# Patient Record
Sex: MALE | Race: BLACK OR AFRICAN AMERICAN | NOT HISPANIC OR LATINO | Employment: OTHER | URBAN - METROPOLITAN AREA
[De-identification: names, ages, dates, MRNs, and addresses within clinical notes are randomized per-mention and may not be internally consistent; named-entity substitution may affect disease eponyms.]

---

## 2017-12-13 ENCOUNTER — HOSPITAL ENCOUNTER (EMERGENCY)
Facility: OTHER | Age: 42
Discharge: HOME | End: 2017-12-13
Attending: EMERGENCY MEDICINE
Payer: MEDICAID

## 2017-12-13 VITALS
RESPIRATION RATE: 17 BRPM | WEIGHT: 260 LBS | DIASTOLIC BLOOD PRESSURE: 92 MMHG | HEART RATE: 76 BPM | HEIGHT: 72 IN | TEMPERATURE: 98 F | OXYGEN SATURATION: 99 % | SYSTOLIC BLOOD PRESSURE: 173 MMHG | BODY MASS INDEX: 35.21 KG/M2

## 2017-12-13 DIAGNOSIS — J06.9 VIRAL URI WITH COUGH: Primary | ICD-10-CM

## 2017-12-13 DIAGNOSIS — R05.9 COUGH: ICD-10-CM

## 2017-12-13 LAB
FLUAV AG SPEC QL IA: NEGATIVE
FLUBV AG SPEC QL IA: NEGATIVE
SPECIMEN SOURCE: NORMAL

## 2017-12-13 PROCEDURE — 99284 EMERGENCY DEPT VISIT MOD MDM: CPT

## 2017-12-13 PROCEDURE — 87400 INFLUENZA A/B EACH AG IA: CPT | Mod: 59

## 2017-12-13 RX ORDER — CARBAMAZEPINE 300 MG/1
300 CAPSULE, EXTENDED RELEASE ORAL 2 TIMES DAILY
COMMUNITY
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
## 2017-12-14 NOTE — ED TRIAGE NOTES
"Pt c/o " bad cold. I get this every year. I've been congested and coughing that past couple days unrelieved by robitussin". Denies fever, N/V.   "

## 2019-01-01 ENCOUNTER — HOSPITAL ENCOUNTER (EMERGENCY)
Facility: OTHER | Age: 44
Discharge: HOME | End: 2019-01-01
Attending: EMERGENCY MEDICINE
Payer: MEDICAID

## 2019-01-01 VITALS
SYSTOLIC BLOOD PRESSURE: 138 MMHG | WEIGHT: 240 LBS | BODY MASS INDEX: 34.36 KG/M2 | TEMPERATURE: 99 F | OXYGEN SATURATION: 98 % | HEIGHT: 70 IN | RESPIRATION RATE: 17 BRPM | HEART RATE: 78 BPM | DIASTOLIC BLOOD PRESSURE: 73 MMHG

## 2019-01-01 DIAGNOSIS — J06.9 URI WITH COUGH AND CONGESTION: Primary | ICD-10-CM

## 2019-01-01 DIAGNOSIS — R05.9 COUGH: ICD-10-CM

## 2019-01-01 LAB
INFLUENZA A, MOLECULAR: NEGATIVE
INFLUENZA B, MOLECULAR: NEGATIVE
SPECIMEN SOURCE: NORMAL

## 2019-01-01 PROCEDURE — 99284 EMERGENCY DEPT VISIT MOD MDM: CPT | Mod: 25

## 2019-01-01 PROCEDURE — 25000242 PHARM REV CODE 250 ALT 637 W/ HCPCS: Performed by: EMERGENCY MEDICINE

## 2019-01-01 PROCEDURE — 94761 N-INVAS EAR/PLS OXIMETRY MLT: CPT

## 2019-01-01 PROCEDURE — 94640 AIRWAY INHALATION TREATMENT: CPT

## 2019-01-01 PROCEDURE — 87502 INFLUENZA DNA AMP PROBE: CPT

## 2019-01-01 RX ORDER — ALBUTEROL SULFATE 90 UG/1
AEROSOL, METERED RESPIRATORY (INHALATION)
Refills: 0 | COMMUNITY
Start: 2018-11-02 | End: 2019-01-01 | Stop reason: SDUPTHER

## 2019-01-01 RX ORDER — IPRATROPIUM BROMIDE AND ALBUTEROL SULFATE 2.5; .5 MG/3ML; MG/3ML
3 SOLUTION RESPIRATORY (INHALATION)
Status: COMPLETED | OUTPATIENT
Start: 2019-01-01 | End: 2019-01-01

## 2019-01-01 RX ORDER — ALBUTEROL SULFATE 90 UG/1
AEROSOL, METERED RESPIRATORY (INHALATION)
Qty: 18 G | Refills: 0 | Status: SHIPPED | OUTPATIENT
Start: 2019-01-01 | End: 2021-12-28 | Stop reason: ALTCHOICE

## 2019-01-01 RX ORDER — ZONISAMIDE 100 MG/1
CAPSULE ORAL
Refills: 5 | COMMUNITY
Start: 2018-12-08

## 2019-01-01 RX ORDER — FLUTICASONE PROPIONATE 50 MCG
1 SPRAY, SUSPENSION (ML) NASAL 2 TIMES DAILY PRN
Qty: 15 G | Refills: 0 | Status: SHIPPED | OUTPATIENT
Start: 2019-01-01

## 2019-01-01 RX ADMIN — IPRATROPIUM BROMIDE AND ALBUTEROL SULFATE 3 ML: .5; 3 SOLUTION RESPIRATORY (INHALATION) at 03:01
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
## 2019-01-01 NOTE — ED TRIAGE NOTES
44 yo male to ED w/ complaints of chest congestion, stuffiness, and cough. Tried taking robitussin but hasn't been working. Symptoms has been going on since X-mas. VSS, AAOx4, NAD. Denies any other medical complaints.

## 2019-01-09 ENCOUNTER — HOSPITAL ENCOUNTER (EMERGENCY)
Facility: OTHER | Age: 44
Discharge: HOME | End: 2019-01-09
Attending: EMERGENCY MEDICINE
Payer: MEDICAID

## 2019-01-09 VITALS
DIASTOLIC BLOOD PRESSURE: 91 MMHG | SYSTOLIC BLOOD PRESSURE: 162 MMHG | BODY MASS INDEX: 34.07 KG/M2 | WEIGHT: 230 LBS | TEMPERATURE: 98 F | HEIGHT: 69 IN | OXYGEN SATURATION: 98 % | HEART RATE: 71 BPM | RESPIRATION RATE: 18 BRPM

## 2019-01-09 DIAGNOSIS — J06.9 URI, ACUTE: Primary | ICD-10-CM

## 2019-01-09 PROCEDURE — 99283 EMERGENCY DEPT VISIT LOW MDM: CPT

## 2019-01-09 RX ORDER — AZITHROMYCIN 250 MG/1
TABLET, FILM COATED ORAL
Qty: 6 TABLET | Refills: 0 | Status: SHIPPED | OUTPATIENT
Start: 2019-01-09

## 2019-01-09 NOTE — ED PROVIDER NOTES
"Encounter Date: 1/9/2019    SCRIBE #1 NOTE: I, Betsy Richardson, am scribing for, and in the presence of, Dr. Shepard.       History     Chief Complaint   Patient presents with    URI     Pt CO productive cough, and nasal congestion since Christmas.      Time seen by provider: 12:45 AM    This is a 43 y.o. male who presents with complaint of productive cough that began approximately sixteen days ago. The patient has recently noticed streaks of blood in sputum. Flu and strep swabs obtained in the ED eight days ago were negative. Upon discharge, the patient was prescribed Flonase and given an albuterol inhaler. He has used these in addition to Vitamin C with little improvement. Pt mentions that he has been "sweating a lot," but denies fever, chills, SOB, or myalgias.       The history is provided by the patient.     Review of patient's allergies indicates:  No Known Allergies  Past Medical History:   Diagnosis Date    Hypertension     Seizures      No past surgical history on file.  No family history on file.  Social History     Tobacco Use    Smoking status: Never Smoker    Smokeless tobacco: Never Used   Substance Use Topics    Alcohol use: No    Drug use: Not on file     Review of Systems   Constitutional: Negative for activity change, appetite change, chills, diaphoresis and fever.   HENT: Negative for congestion, sore throat and trouble swallowing.    Eyes: Negative for photophobia and visual disturbance.   Respiratory: Positive for cough. Negative for chest tightness and shortness of breath.    Cardiovascular: Negative for chest pain.   Gastrointestinal: Negative for abdominal pain, nausea and vomiting.   Endocrine: Negative for polydipsia, polyphagia and polyuria.   Genitourinary: Negative for difficulty urinating and flank pain.   Musculoskeletal: Negative for back pain and neck pain.   Skin: Negative for pallor.   Neurological: Negative for weakness and headaches.   Psychiatric/Behavioral: Negative for " confusion.       Physical Exam     Initial Vitals [01/09/19 0020]   BP Pulse Resp Temp SpO2   (!) 162/91 71 18 98.2 °F (36.8 °C) 98 %      MAP       --         Physical Exam    Nursing note and vitals reviewed.  Constitutional: He appears well-developed and well-nourished. He is not diaphoretic. No distress.   HENT:   Head: Normocephalic and atraumatic.   Mouth/Throat: Oropharynx is clear and moist.   Eyes: EOM are normal. Pupils are equal, round, and reactive to light.   Neck: Normal range of motion.   Cardiovascular: Normal rate, regular rhythm and normal heart sounds. Exam reveals no gallop and no friction rub.    No murmur heard.  Pulmonary/Chest: Breath sounds normal. No respiratory distress. He has no wheezes. He has no rhonchi. He has no rales.   Musculoskeletal: Normal range of motion. He exhibits no edema or tenderness.   Neurological: He is alert and oriented to person, place, and time.   Skin: Skin is warm and dry. No rash and no abscess noted. No erythema. No pallor.   Psychiatric: He has a normal mood and affect. His behavior is normal. Judgment and thought content normal.         ED Course   Procedures  Labs Reviewed - No data to display             Additional MDM:   Comments: 43-year-old male presents complaining of symptoms consistent with an upper respiratory infection.  This is his 2nd emergency department visit for the same complaint.  He has previously had a chest x-ray as well as a flu swab which were negative. At this time his symptoms have been present for approximately 2 weeks, therefore, he will be given a prescription for Z-Javon.  Patient instructed to follow up with his primary care doctor within the next 5-7 days upon completion of the antibiotics..          Scribe Attestation:   Scribe #1: I performed the above scribed service and the documentation accurately describes the services I performed. I attest to the accuracy of the note.    Attending Attestation:           Physician Attestation  for Scribe:  Physician Attestation Statement for Scribe #1: I, Dr. Shepard, reviewed documentation, as scribed by Betsy Richardson in my presence, and it is both accurate and complete.                    Clinical Impression:     1. URI, acute                                   Rosa Maria Shepard MD  01/09/19 0128

## 2019-12-20 ENCOUNTER — HOSPITAL ENCOUNTER (EMERGENCY)
Facility: OTHER | Age: 44
Discharge: HOME | End: 2019-12-20
Attending: EMERGENCY MEDICINE
Payer: MEDICAID

## 2019-12-20 VITALS
DIASTOLIC BLOOD PRESSURE: 82 MMHG | RESPIRATION RATE: 17 BRPM | HEIGHT: 72 IN | WEIGHT: 243 LBS | BODY MASS INDEX: 32.91 KG/M2 | OXYGEN SATURATION: 99 % | HEART RATE: 60 BPM | TEMPERATURE: 98 F | SYSTOLIC BLOOD PRESSURE: 142 MMHG

## 2019-12-20 DIAGNOSIS — R05.9 COUGH: Primary | ICD-10-CM

## 2019-12-20 DIAGNOSIS — M54.42 ACUTE LEFT-SIDED LOW BACK PAIN WITH LEFT-SIDED SCIATICA: ICD-10-CM

## 2019-12-20 LAB
CTP QC/QA: YES
POC MOLECULAR INFLUENZA A AGN: NEGATIVE
POC MOLECULAR INFLUENZA B AGN: NEGATIVE

## 2019-12-20 PROCEDURE — 94640 AIRWAY INHALATION TREATMENT: CPT

## 2019-12-20 PROCEDURE — 25000242 PHARM REV CODE 250 ALT 637 W/ HCPCS: Performed by: PHYSICIAN ASSISTANT

## 2019-12-20 PROCEDURE — 99284 EMERGENCY DEPT VISIT MOD MDM: CPT | Mod: 25

## 2019-12-20 PROCEDURE — 63600175 PHARM REV CODE 636 W HCPCS: Performed by: PHYSICIAN ASSISTANT

## 2019-12-20 PROCEDURE — 96372 THER/PROPH/DIAG INJ SC/IM: CPT

## 2019-12-20 RX ORDER — PREDNISONE 20 MG/1
60 TABLET ORAL
Status: COMPLETED | OUTPATIENT
Start: 2019-12-20 | End: 2019-12-20

## 2019-12-20 RX ORDER — METHOCARBAMOL 500 MG/1
1000 TABLET, FILM COATED ORAL 3 TIMES DAILY PRN
Qty: 18 TABLET | Refills: 0 | Status: SHIPPED | OUTPATIENT
Start: 2019-12-20

## 2019-12-20 RX ORDER — KETOROLAC TROMETHAMINE 30 MG/ML
30 INJECTION, SOLUTION INTRAMUSCULAR; INTRAVENOUS
Status: COMPLETED | OUTPATIENT
Start: 2019-12-20 | End: 2019-12-20

## 2019-12-20 RX ORDER — PREDNISONE 50 MG/1
50 TABLET ORAL DAILY
Qty: 4 TABLET | Refills: 0 | Status: SHIPPED | OUTPATIENT
Start: 2019-12-21 | End: 2019-12-25

## 2019-12-20 RX ORDER — IPRATROPIUM BROMIDE AND ALBUTEROL SULFATE 2.5; .5 MG/3ML; MG/3ML
3 SOLUTION RESPIRATORY (INHALATION)
Status: COMPLETED | OUTPATIENT
Start: 2019-12-20 | End: 2019-12-20

## 2019-12-20 RX ORDER — NAPROXEN 500 MG/1
500 TABLET ORAL EVERY 12 HOURS PRN
Qty: 10 TABLET | Refills: 0 | Status: SHIPPED | OUTPATIENT
Start: 2019-12-20 | End: 2019-12-25

## 2019-12-20 RX ADMIN — PREDNISONE 60 MG: 20 TABLET ORAL at 11:12

## 2019-12-20 RX ADMIN — IPRATROPIUM BROMIDE AND ALBUTEROL SULFATE 3 ML: .5; 3 SOLUTION RESPIRATORY (INHALATION) at 11:12

## 2019-12-20 RX ADMIN — KETOROLAC TROMETHAMINE 30 MG: 30 INJECTION, SOLUTION INTRAMUSCULAR; INTRAVENOUS at 11:12

## 2019-12-20 NOTE — ED TRIAGE NOTES
Patient presents to ER with c/o cough with nasal congestion for several weeks patient reports taking mucinex with no relieve.  Patient also reports lower back pain for several days.  Pain 10/10.  Patient denies chest pain, sob, any radiating pain, nausea and vomiting.

## 2019-12-20 NOTE — ED PROVIDER NOTES
Encounter Date: 12/20/2019       History     Chief Complaint   Patient presents with    Cough     Pt reports white productive for the past four weeks not relieved with mucinex. Pt also reports lower back pain for the past two days.      Patient is a 44-year-old male with hypertension and seizures who presents to the emergency department with a cough and back pain. Patient reports a productive cough with white and yellow sputum for the past 3 weeks.  He reports shortness of breath only when lying down at night, specifically when he has a postnasal drip.  He also reports congestion.  He reports taking over-the-counter medications without relief.  He denies fever or chills.  He is also reporting left lower back pain which radiates into his left buttocks and behind his left leg for the past 3 days.  He denies injury. He denies saddle anesthesia or bowel/bladder incontinence.  He has not taken analgesics for his back pain.    The history is provided by the patient.     Review of patient's allergies indicates:  No Known Allergies  Past Medical History:   Diagnosis Date    Hypertension     Seizures      No past surgical history on file.  No family history on file.  Social History     Tobacco Use    Smoking status: Never Smoker    Smokeless tobacco: Never Used   Substance Use Topics    Alcohol use: No    Drug use: Not on file     Review of Systems   Constitutional: Negative for chills and fever.   HENT: Positive for congestion. Negative for sore throat.    Respiratory: Positive for cough and shortness of breath (With lying down).    Cardiovascular: Negative for chest pain.   Gastrointestinal: Negative for abdominal pain, diarrhea, nausea and vomiting.   Genitourinary: Negative for difficulty urinating.   Musculoskeletal: Positive for back pain. Negative for arthralgias.   Skin: Negative for rash.   Allergic/Immunologic: Negative for immunocompromised state.   Neurological: Negative for weakness, numbness and  headaches.       Physical Exam     Initial Vitals [12/20/19 1034]   BP Pulse Resp Temp SpO2   (!) 145/78 64 18 98.5 °F (36.9 °C) 99 %      MAP       --         Physical Exam    Nursing note and vitals reviewed.  Constitutional: He is cooperative. No distress.   HENT:   Head: Normocephalic and atraumatic.   Eyes: Conjunctivae and EOM are normal.   Neck: Normal range of motion. Neck supple.   Cardiovascular: Normal rate, regular rhythm and intact distal pulses.   Pulmonary/Chest: He has no rhonchi. He has no rales.   Slightly diminished air movement upon expiration.   Musculoskeletal: Normal range of motion. He exhibits no edema.   No CT L midline tenderness, crepitus, masses, step-offs or deformities.  Negative straight leg raise but pain is reproduced with this on the left side to the lower back   Neurological: He is alert and oriented to person, place, and time. He has normal strength. GCS eye subscore is 4. GCS verbal subscore is 5. GCS motor subscore is 6.   Skin: Skin is warm and dry. No rash noted.         ED Course   Procedures  Labs Reviewed - No data to display       Imaging Results          X-Ray Chest PA And Lateral (Final result)  Result time 12/20/19 10:54:41    Final result by Kwan Saenz MD (12/20/19 10:54:41)                 Impression:      No acute abnormality.      Electronically signed by: Kwan Saenz MD  Date:    12/20/2019  Time:    10:54             Narrative:    EXAMINATION:  XR CHEST PA AND LATERAL    CLINICAL HISTORY:  Cough    TECHNIQUE:  PA and lateral views of the chest were performed.    COMPARISON:  01/01/2019    FINDINGS:  The lungs are clear, with normal appearance of pulmonary vasculature and no pleural effusion or pneumothorax.    The cardiac silhouette is normal in size. The hilar and mediastinal contours are unremarkable.    Bones are intact. Innumerable round radiopaque densities overlie the left harsh chest and left upper extremity, unchanged.                                  Medical Decision Making:   Initial Assessment:   Urgent evaluation of a 44 y.o. male presenting to the emergency department complaining of cough and nontraumatic back pain. Patient is afebrile, nontoxic appearing and hemodynamically stable.  1) cough for 3 weeks, no fever.  Slightly diminished air movement upon expiration.  He is not hypoxic or tachypneic.  Patient has inhaler at home but is not using it.  Chest x-ray obtained from waiting room reveals no consolidation.  Will provide patient with breathing treatment and steroids.  2) back pain- The patient's back pain is likely a musculoskeletal strain versus sciatica.  There are no signs of saddle anesthesia, incontinence, neurologic deficits, fevers, trauma or midline tenderness on history or physical to suggest cauda equina, infectious process, fracture or subluxation.  I will treat with anti-inflammatories and muscle relaxers for relief.                                 Clinical Impression:     1. Cough    2. Acute left-sided low back pain with left-sided sciatica                            Prashant Palencia PA-C  12/20/19 2871

## 2020-09-02 ENCOUNTER — HOSPITAL ENCOUNTER (EMERGENCY)
Facility: OTHER | Age: 45
Discharge: HOME | End: 2020-09-02
Attending: EMERGENCY MEDICINE
Payer: MEDICAID

## 2020-09-02 VITALS
BODY MASS INDEX: 33.18 KG/M2 | HEART RATE: 74 BPM | RESPIRATION RATE: 16 BRPM | OXYGEN SATURATION: 100 % | DIASTOLIC BLOOD PRESSURE: 81 MMHG | TEMPERATURE: 98 F | HEIGHT: 72 IN | SYSTOLIC BLOOD PRESSURE: 164 MMHG | WEIGHT: 245 LBS

## 2020-09-02 DIAGNOSIS — L03.011 PARONYCHIA OF RIGHT MIDDLE FINGER: Primary | ICD-10-CM

## 2020-09-02 PROCEDURE — 99284 EMERGENCY DEPT VISIT MOD MDM: CPT | Mod: 25

## 2020-09-02 PROCEDURE — 10060 I&D ABSCESS SIMPLE/SINGLE: CPT

## 2020-09-02 PROCEDURE — 25000003 PHARM REV CODE 250: Performed by: EMERGENCY MEDICINE

## 2020-09-02 RX ORDER — IBUPROFEN 600 MG/1
600 TABLET ORAL
Status: COMPLETED | OUTPATIENT
Start: 2020-09-02 | End: 2020-09-02

## 2020-09-02 RX ORDER — HYDROCODONE BITARTRATE AND ACETAMINOPHEN 5; 325 MG/1; MG/1
1 TABLET ORAL EVERY 4 HOURS PRN
Qty: 12 TABLET | Refills: 0 | OUTPATIENT
Start: 2020-09-02 | End: 2021-07-14

## 2020-09-02 RX ORDER — IBUPROFEN 600 MG/1
600 TABLET ORAL EVERY 6 HOURS PRN
Qty: 20 TABLET | Refills: 0 | OUTPATIENT
Start: 2020-09-02 | End: 2021-07-14

## 2020-09-02 RX ORDER — CLINDAMYCIN HYDROCHLORIDE 150 MG/1
300 CAPSULE ORAL 4 TIMES DAILY
Qty: 56 CAPSULE | Refills: 0 | Status: SHIPPED | OUTPATIENT
Start: 2020-09-02 | End: 2020-09-09

## 2020-09-02 RX ADMIN — IBUPROFEN 600 MG: 600 TABLET, FILM COATED ORAL at 04:09

## 2020-09-02 NOTE — ED TRIAGE NOTES
"Pt presents with right middle finger pain and swelling after biting a "hang nail" in his sleep. He reports pain is 8 out of 10. Pt is AAO x 3, answers questions appropriately   "

## 2020-09-02 NOTE — ED PROVIDER NOTES
Encounter Date: 9/2/2020    SCRIBE #1 NOTE: I, Holly Ino, am scribing for, and in the presence of, Dr. Earl.       History     Chief Complaint   Patient presents with    Hand Pain     pain and swelling to R middle finger after biting hangnail off     Time seen by provider: 4:10 PM    This is a 44 y.o. male who presents with complaint of right middle finger pain. Patient reports he bit a hang nail off of the finger and is now having pain and swelling. Incident occurred 2 days ago and has worsened since. He denies any drainage or redness.     The history is provided by the patient. No  was used.     Review of patient's allergies indicates:  No Known Allergies  Past Medical History:   Diagnosis Date    Hypertension     Seizures      History reviewed. No pertinent surgical history.  History reviewed. No pertinent family history.  Social History     Tobacco Use    Smoking status: Never Smoker    Smokeless tobacco: Never Used   Substance Use Topics    Alcohol use: No    Drug use: Not on file     Review of Systems   Constitutional: Negative for fever.   HENT: Negative for sore throat.    Respiratory: Negative for shortness of breath.    Cardiovascular: Negative for chest pain.   Gastrointestinal: Negative for nausea.   Genitourinary: Negative for dysuria.   Musculoskeletal: Positive for arthralgias (right middle finger). Negative for back pain.   Skin: Negative for rash.   Neurological: Negative for weakness.   Hematological: Does not bruise/bleed easily.   All other systems reviewed and are negative.      Physical Exam     Initial Vitals [09/02/20 1555]   BP Pulse Resp Temp SpO2   (!) 190/98 77 18 98.4 °F (36.9 °C) (!) 94 %      MAP       --         Physical Exam    Nursing note and vitals reviewed.  Constitutional: He appears well-developed. He is cooperative.   HENT:   Head: Atraumatic.   Eyes: Conjunctivae and lids are normal.   Neck: Normal range of motion and phonation normal.    Cardiovascular: Normal rate.   Pulmonary/Chest: No respiratory distress.   Musculoskeletal: Normal range of motion. No tenderness or edema.   Neurological: He is alert.   Skin: Skin is warm and dry. No rash noted.   Paronychia noted over radial aspect of right third digit.   Tenderness noted.   Normal ROM, no skin change, no erythema, no felon.    Psychiatric: He has a normal mood and affect. His speech is normal and behavior is normal.         ED Course   I & D - Incision and Drainage    Date/Time: 9/2/2020 5:09 PM  Location procedure was performed: [facility] EMERGENCY DEPARTMENT  Performed by: Deb Earl MD  Authorized by: Deb Earl MD   Consent Done: Yes  Consent: Verbal consent obtained.  Indications for incision and drainage: paronychia.  Body area: upper extremity  Location details: right long finger  Patient sedated: no  Scalpel size: 11  Incision type: single straight  Complexity: simple  Drainage: purulent  Drainage amount: copious  Complications: No  Patient tolerance: Patient tolerated the procedure well with no immediate complications        Labs Reviewed - No data to display       Imaging Results    None          Medical Decision Making:   History:   Old Medical Records: I decided to obtain old medical records.  Old Records Summarized: other records.  Initial Assessment:   43 y/o male with paronychia pain x 2 days. Plan to I&D.   Differential Diagnosis:   Ingrown nail, paronychia, abscess, felon, cellulitis            Scribe Attestation:   Scribe #1: I performed the above scribed service and the documentation accurately describes the services I performed. I attest to the accuracy of the note.    Attending Attestation:           Physician Attestation for Scribe:  Physician Attestation Statement for Scribe #1: I, Dr. Earl, reviewed documentation, as scribed by Holly Mckinnon in my presence, and it is both accurate and complete.                               Clinical Impression:       ICD-10-CM ICD-9-CM    1. Paronychia of right middle finger  L03.011 681.02         Disposition:   Disposition: Discharged  Condition: Stable     ED Disposition Condition    Discharge Stable        ED Prescriptions     Medication Sig Dispense Start Date End Date Auth. Provider    clindamycin (CLEOCIN) 150 MG capsule Take 2 capsules (300 mg total) by mouth 4 (four) times daily. for 7 days 56 capsule 9/2/2020 9/9/2020 Deb Earl MD    HYDROcodone-acetaminophen (NORCO) 5-325 mg per tablet Take 1 tablet by mouth every 4 (four) hours as needed for Pain. 12 tablet 9/2/2020  Deb Earl MD    ibuprofen (ADVIL,MOTRIN) 600 MG tablet Take 1 tablet (600 mg total) by mouth every 6 (six) hours as needed for Pain. Take with food 20 tablet 9/2/2020  Deb Earl MD        Follow-up Information     Follow up With Specialties Details Why Contact Info    [facility]  Schedule an appointment as soon as possible for a visit   [address] [facility] [address]  [phone]      [facility]    [address] [facility] [address]  [phone]                                       Deb Earl MD  09/03/20 0638

## 2021-07-14 ENCOUNTER — HOSPITAL ENCOUNTER (EMERGENCY)
Facility: OTHER | Age: 46
Discharge: HOME | End: 2021-07-14
Attending: EMERGENCY MEDICINE
Payer: MEDICAID

## 2021-07-14 VITALS
DIASTOLIC BLOOD PRESSURE: 74 MMHG | HEIGHT: 72 IN | BODY MASS INDEX: 32.51 KG/M2 | HEART RATE: 71 BPM | OXYGEN SATURATION: 99 % | RESPIRATION RATE: 20 BRPM | TEMPERATURE: 98 F | SYSTOLIC BLOOD PRESSURE: 131 MMHG | WEIGHT: 240 LBS

## 2021-07-14 DIAGNOSIS — N20.0 KIDNEY STONE: ICD-10-CM

## 2021-07-14 DIAGNOSIS — N20.1 URETEROLITHIASIS: Primary | ICD-10-CM

## 2021-07-14 LAB
ALBUMIN SERPL BCP-MCNC: 3.7 G/DL (ref 3.5–5.2)
ALP SERPL-CCNC: 77 U/L (ref 55–135)
ALT SERPL W/O P-5'-P-CCNC: 22 U/L (ref 10–44)
ANION GAP SERPL CALC-SCNC: 7 MMOL/L (ref 8–16)
AST SERPL-CCNC: 19 U/L (ref 10–40)
BASOPHILS # BLD AUTO: 0.04 K/UL (ref 0–0.2)
BASOPHILS NFR BLD: 0.6 % (ref 0–1.9)
BILIRUB SERPL-MCNC: 0.3 MG/DL (ref 0.1–1)
BILIRUB UR QL STRIP: NEGATIVE
BUN SERPL-MCNC: 13 MG/DL (ref 6–20)
CALCIUM SERPL-MCNC: 8.5 MG/DL (ref 8.7–10.5)
CHLORIDE SERPL-SCNC: 106 MMOL/L (ref 95–110)
CLARITY UR: CLEAR
CO2 SERPL-SCNC: 24 MMOL/L (ref 23–29)
COLOR UR: YELLOW
CREAT SERPL-MCNC: 1.2 MG/DL (ref 0.5–1.4)
CREAT SERPL-MCNC: 1.2 MG/DL (ref 0.5–1.4)
DIFFERENTIAL METHOD: ABNORMAL
EOSINOPHIL # BLD AUTO: 0 K/UL (ref 0–0.5)
EOSINOPHIL NFR BLD: 0.1 % (ref 0–8)
ERYTHROCYTE [DISTWIDTH] IN BLOOD BY AUTOMATED COUNT: 13.3 % (ref 11.5–14.5)
EST. GFR  (AFRICAN AMERICAN): >60 ML/MIN/1.73 M^2
EST. GFR  (NON AFRICAN AMERICAN): >60 ML/MIN/1.73 M^2
GLUCOSE SERPL-MCNC: 128 MG/DL (ref 70–110)
GLUCOSE UR QL STRIP: NEGATIVE
HCT VFR BLD AUTO: 40.2 % (ref 40–54)
HGB BLD-MCNC: 13.3 G/DL (ref 14–18)
HGB UR QL STRIP: ABNORMAL
IMM GRANULOCYTES # BLD AUTO: 0.02 K/UL (ref 0–0.04)
IMM GRANULOCYTES NFR BLD AUTO: 0.3 % (ref 0–0.5)
KETONES UR QL STRIP: NEGATIVE
LEUKOCYTE ESTERASE UR QL STRIP: NEGATIVE
LYMPHOCYTES # BLD AUTO: 1 K/UL (ref 1–4.8)
LYMPHOCYTES NFR BLD: 15.2 % (ref 18–48)
MCH RBC QN AUTO: 28.9 PG (ref 27–31)
MCHC RBC AUTO-ENTMCNC: 33.1 G/DL (ref 32–36)
MCV RBC AUTO: 87 FL (ref 82–98)
MICROSCOPIC COMMENT: NORMAL
MONOCYTES # BLD AUTO: 0.7 K/UL (ref 0.3–1)
MONOCYTES NFR BLD: 10 % (ref 4–15)
NEUTROPHILS # BLD AUTO: 5 K/UL (ref 1.8–7.7)
NEUTROPHILS NFR BLD: 73.8 % (ref 38–73)
NITRITE UR QL STRIP: NEGATIVE
NRBC BLD-RTO: 0 /100 WBC
PH UR STRIP: 7 [PH] (ref 5–8)
PLATELET # BLD AUTO: 185 K/UL (ref 150–450)
PMV BLD AUTO: 10.3 FL (ref 9.2–12.9)
POTASSIUM SERPL-SCNC: 4.1 MMOL/L (ref 3.5–5.1)
PROT SERPL-MCNC: 6.7 G/DL (ref 6–8.4)
PROT UR QL STRIP: NEGATIVE
RBC # BLD AUTO: 4.6 M/UL (ref 4.6–6.2)
RBC #/AREA URNS HPF: 3 /HPF (ref 0–4)
SAMPLE: NORMAL
SODIUM SERPL-SCNC: 137 MMOL/L (ref 136–145)
SP GR UR STRIP: 1.01 (ref 1–1.03)
URN SPEC COLLECT METH UR: ABNORMAL
UROBILINOGEN UR STRIP-ACNC: NEGATIVE EU/DL
WBC # BLD AUTO: 6.79 K/UL (ref 3.9–12.7)
WBC #/AREA URNS HPF: 1 /HPF (ref 0–5)

## 2021-07-14 PROCEDURE — 87591 N.GONORRHOEAE DNA AMP PROB: CPT | Performed by: EMERGENCY MEDICINE

## 2021-07-14 PROCEDURE — 63600175 PHARM REV CODE 636 W HCPCS: Performed by: EMERGENCY MEDICINE

## 2021-07-14 PROCEDURE — 80053 COMPREHEN METABOLIC PANEL: CPT | Performed by: EMERGENCY MEDICINE

## 2021-07-14 PROCEDURE — 96374 THER/PROPH/DIAG INJ IV PUSH: CPT

## 2021-07-14 PROCEDURE — 99285 EMERGENCY DEPT VISIT HI MDM: CPT | Mod: 25

## 2021-07-14 PROCEDURE — 25500020 PHARM REV CODE 255: Performed by: EMERGENCY MEDICINE

## 2021-07-14 PROCEDURE — 85025 COMPLETE CBC W/AUTO DIFF WBC: CPT | Performed by: EMERGENCY MEDICINE

## 2021-07-14 PROCEDURE — 96376 TX/PRO/DX INJ SAME DRUG ADON: CPT

## 2021-07-14 PROCEDURE — 96375 TX/PRO/DX INJ NEW DRUG ADDON: CPT

## 2021-07-14 PROCEDURE — 87491 CHLMYD TRACH DNA AMP PROBE: CPT | Performed by: EMERGENCY MEDICINE

## 2021-07-14 PROCEDURE — 36415 COLL VENOUS BLD VENIPUNCTURE: CPT | Performed by: EMERGENCY MEDICINE

## 2021-07-14 PROCEDURE — 81000 URINALYSIS NONAUTO W/SCOPE: CPT | Performed by: EMERGENCY MEDICINE

## 2021-07-14 RX ORDER — ONDANSETRON 4 MG/1
4 TABLET, ORALLY DISINTEGRATING ORAL EVERY 6 HOURS PRN
Qty: 12 TABLET | Refills: 0 | Status: SHIPPED | OUTPATIENT
Start: 2021-07-14 | End: 2021-12-28 | Stop reason: ALTCHOICE

## 2021-07-14 RX ORDER — HYDROMORPHONE HYDROCHLORIDE 1 MG/ML
0.5 INJECTION, SOLUTION INTRAMUSCULAR; INTRAVENOUS; SUBCUTANEOUS EVERY 30 MIN PRN
Status: DISCONTINUED | OUTPATIENT
Start: 2021-07-14 | End: 2021-07-14 | Stop reason: HOSPADM

## 2021-07-14 RX ORDER — DOCUSATE SODIUM 100 MG/1
100 CAPSULE, LIQUID FILLED ORAL 2 TIMES DAILY PRN
Qty: 60 CAPSULE | Refills: 0 | Status: SHIPPED | OUTPATIENT
Start: 2021-07-14 | End: 2024-04-02

## 2021-07-14 RX ORDER — HYDROCODONE BITARTRATE AND ACETAMINOPHEN 5; 325 MG/1; MG/1
1 TABLET ORAL EVERY 4 HOURS PRN
Qty: 10 TABLET | Refills: 0 | Status: SHIPPED | OUTPATIENT
Start: 2021-07-14 | End: 2021-07-17

## 2021-07-14 RX ORDER — ONDANSETRON 2 MG/ML
8 INJECTION INTRAMUSCULAR; INTRAVENOUS
Status: COMPLETED | OUTPATIENT
Start: 2021-07-14 | End: 2021-07-14

## 2021-07-14 RX ORDER — NAPROXEN 500 MG/1
500 TABLET ORAL 2 TIMES DAILY PRN
Qty: 60 TABLET | Refills: 0 | Status: SHIPPED | OUTPATIENT
Start: 2021-07-14 | End: 2021-12-28 | Stop reason: ALTCHOICE

## 2021-07-14 RX ORDER — TAMSULOSIN HYDROCHLORIDE 0.4 MG/1
0.4 CAPSULE ORAL DAILY
Qty: 30 CAPSULE | Refills: 0 | Status: SHIPPED | OUTPATIENT
Start: 2021-07-14 | End: 2021-08-13

## 2021-07-14 RX ADMIN — ONDANSETRON 8 MG: 2 INJECTION INTRAMUSCULAR; INTRAVENOUS at 06:07

## 2021-07-14 RX ADMIN — HYDROMORPHONE HYDROCHLORIDE 0.5 MG: 1 INJECTION, SOLUTION INTRAMUSCULAR; INTRAVENOUS; SUBCUTANEOUS at 08:07

## 2021-07-14 RX ADMIN — HYDROMORPHONE HYDROCHLORIDE 0.5 MG: 1 INJECTION, SOLUTION INTRAMUSCULAR; INTRAVENOUS; SUBCUTANEOUS at 06:07

## 2021-07-14 RX ADMIN — IOHEXOL 100 ML: 350 INJECTION, SOLUTION INTRAVENOUS at 07:07

## 2021-07-19 LAB
C TRACH DNA SPEC QL NAA+PROBE: NOT DETECTED
N GONORRHOEA DNA SPEC QL NAA+PROBE: NOT DETECTED

## 2021-12-28 ENCOUNTER — HOSPITAL ENCOUNTER (EMERGENCY)
Facility: OTHER | Age: 46
Discharge: HOME | End: 2021-12-29
Attending: EMERGENCY MEDICINE
Payer: MEDICAID

## 2021-12-28 VITALS
OXYGEN SATURATION: 97 % | TEMPERATURE: 99 F | BODY MASS INDEX: 35.55 KG/M2 | RESPIRATION RATE: 20 BRPM | HEIGHT: 69 IN | WEIGHT: 240 LBS | HEART RATE: 86 BPM | SYSTOLIC BLOOD PRESSURE: 191 MMHG | DIASTOLIC BLOOD PRESSURE: 109 MMHG

## 2021-12-28 DIAGNOSIS — U07.1 COVID-19 VIRUS INFECTION: Primary | ICD-10-CM

## 2021-12-28 LAB
CTP QC/QA: YES
SARS-COV-2 RDRP RESP QL NAA+PROBE: POSITIVE

## 2021-12-28 PROCEDURE — U0002 COVID-19 LAB TEST NON-CDC: HCPCS | Performed by: EMERGENCY MEDICINE

## 2021-12-28 PROCEDURE — 99284 EMERGENCY DEPT VISIT MOD MDM: CPT

## 2021-12-28 RX ORDER — ACETAMINOPHEN 325 MG/1
650 TABLET ORAL EVERY 6 HOURS PRN
Qty: 30 TABLET | Refills: 0 | Status: SHIPPED | OUTPATIENT
Start: 2021-12-28 | End: 2024-04-02

## 2021-12-28 RX ORDER — ALBUTEROL SULFATE 90 UG/1
1-2 AEROSOL, METERED RESPIRATORY (INHALATION) EVERY 6 HOURS PRN
Qty: 6.7 G | Refills: 0 | Status: SHIPPED | OUTPATIENT
Start: 2021-12-28 | End: 2024-04-02 | Stop reason: SDUPTHER

## 2021-12-28 RX ORDER — BENZONATATE 100 MG/1
100 CAPSULE ORAL 3 TIMES DAILY PRN
Qty: 20 CAPSULE | Refills: 0 | Status: SHIPPED | OUTPATIENT
Start: 2021-12-28 | End: 2022-01-07

## 2021-12-28 RX ORDER — NAPROXEN 500 MG/1
500 TABLET ORAL 2 TIMES DAILY PRN
Qty: 60 TABLET | Refills: 0 | Status: SHIPPED | OUTPATIENT
Start: 2021-12-28 | End: 2024-04-02

## 2021-12-28 RX ORDER — ONDANSETRON 4 MG/1
4 TABLET, ORALLY DISINTEGRATING ORAL EVERY 6 HOURS PRN
Qty: 12 TABLET | Refills: 0 | Status: SHIPPED | OUTPATIENT
Start: 2021-12-28 | End: 2024-04-02

## 2021-12-29 DIAGNOSIS — U07.1 COVID-19 VIRUS DETECTED: ICD-10-CM

## 2021-12-29 NOTE — DISCHARGE INSTRUCTIONS
"This a Doctor's Order and Doctor's Excuse Note.   Your COVID19 test was positive. Your symptoms are due to COVID19. Y  Isolate at home for 5 days.   Stay home except to get medical care.   Do not leave home. Do not go to work, school, stores, or other public places.   Do not go to see anyone you do not live with.   Separate yourself from other people and animals in your home. Wear a mask when around others you live with. Use a separate bathroom if possible.   If you have no symptoms or your symptoms are resolving on day 6 days, you can leave your house.  Continue to strictly wear a mask around others for 5 additional days.  If you have a fever, continue to stay home until your fever resolves.    Staying away from others will the save lives of people you know and love.   Avoid sharing personal household items.   Clean all "high touch" surfaces every day.   Monitor your symptoms carefully. If your symptoms get worse, call your healthcare provider immediately, or 211, or [facility] nurse line [phone]?or?[phone].    Notify your close contacts  a close contact is anyone who you were within 6 feet for a combined total of 15 minutes or more over a 24-hour period, whether or not you were wearing a mask.   Let them know you have COVID19 so that they can quarantine at home and get tested.     Wash your hands often with soap and water or alcohol-based hand . Make sure you are covering all surfaces of your hands and rubbing them together until they feel dry. This takes at least 20 seconds.   Get rest and stay hydrated. Take tylenol (acetaminophen) or ibuprofen (motrin, advil) for aches, pains, and fever. Take decongestants for congestion.  For meal, rent, and other assistance call 311 ([phone]) or go to [place].gov/311   Check www.cdc.gov/coronavirus for latest official information      Contact Tracing: As one of the next steps, you will receive a call or text from [place] Department of Health COVID19 " "contract tracing team. They will call from [phone] with Caller ID "[facility]." It is important that your respond to them or call them.   Discussions with health department staff are confidential. This means that your personal and medical information will be kept private and only shared with those who may need to know, like your health care provider.  Your name will not be shared with those you came in contact with. The health department will only notify people you were in close contact with that they might have been exposed to COVID-19.  Your information will be collected for health purposes only and will not be shared with any other agencies, like law enforcement or immigration.      "

## 2021-12-30 NOTE — ED PROVIDER NOTES
"  Source of History:  Medical record, patient    Chief complaint:  Per triage note: "COVID-19 Concerns (Pt with cough and chest congestion and headache for the past 3 days; states he just recently lost his sense of smell.)  "    HPI:    Jose A Sweet is a 46 y.o. male who presents with fevers, chills for last day.  Patient denies any sick contacts.  Patient has not been vaccinated against COVID-19.  Symptoms not improved with Tylenol.  Denies any modifying factors.  He denies any associated dyspnea or other associated symptoms.  This is the extent of the patient's complaints at this time.     ROS:   As per HPI and below:   General:  Notes fevers, chills.  HENT: No facial pain.    Eyes: No eye pain.   Cardiovascular: No chest pain.   Respiratory:  No dyspnea.   GI: No abdominal pain.  No nausea, no vomiting.  Skin: No rashes.   Neuro:  No syncope.  No focal deficits.   Musculoskeletal: No myalgias.  All other systems reviewed and are negative.      Review of patient's allergies indicates:  No Known Allergies    PMH:  As per HPI and below:  Past Medical History:   Diagnosis Date    Hypertension     Seizures        No past surgical history on file.    Social History     Tobacco Use    Smoking status: Never Smoker    Smokeless tobacco: Never Used   Substance Use Topics    Alcohol use: No       Physical Exam:      Nursing note and vitals reviewed.    BP (!) 191/109 (BP Location: Left arm, Patient Position: Sitting)   Pulse 86   Temp 99.2 °F (37.3 °C) (Oral)   Resp 20   Ht 5' 9" (1.753 m)   Wt 108.9 kg (240 lb)   SpO2 97%   BMI 35.44 kg/m²   Constitutional: AAOx3. No distress.   Eyes: EOMI. No discharge. Anicteric.  HENT:   Neck: Normal range of motion. Neck supple.  Cardiovascular: Normal rate.  Regular rhythm.    Pulmonary/Chest: No respiratory distress. Effort normal.  No tachypnea.  Abdominal: No distension and no mass.   Musculoskeletal: Normal range of motion.   Neurological: GCS 15. Alert and " oriented to person, place, and time. No gross cranial nerve, light touch or strength deficit. Coordination normal.   Skin: Skin is warm and dry.   EXT: 2+ radial pulses.   Psychiatric: Behavior is normal. Judgment normal.        MDM:    I decided to obtain the patient's medical records.  Pt is a 46 y.o. with hypertension, history of seizures who presents with symptoms consistent with COVID-19 infection.  On exam, patient well-appearing.  Initial differential included COVID 19 infection, influenza, other acute viral syndrome.  Low suspicion for severe metabolic derangement, sepsis, organ failure including acute respiratory failure.  I independently reviewed and interpreted labs which are notable for positive point-of-care COVID-19 PCR.  Patient had no hypoxia  Patient does not meet current admission/hospital observation criteria.     Patient instructed to self isolate in a room in their home away from others they live with, advised not to leave home for any reason, and given community resources to assist in this. Pt instructed to do so until at least 10 days after onset, and symptoms resolved x24 hrs, and no antipyretics x24 hrs per CDC and health dept recommendations. Pt has access to masks.     COVID-19 antiviral treatment  paxlovid/nirmatrelvir has received EUA, however is not yet available. Risks and benefits of monoclonal antibody treatment were considered, however there are indications that the modest, if any benefit, with ancestral strains is diminished with omicron variant.     Patient instructed on notification of recent contacts, importance of participating in contact tracing, maximal supportive care, to check temperature at least daily, strict return instructions particularly respiratory distress and decompensation.    I ordered pulse oximeter for home use to be given prior to discharge.  Patient counseled to obtain COVID vaccination as early as 2 weeks after symptom resolution pending further CDC guidance  on post infection vaccination recommendations.  Patient verbalized understanding of plan, strict return precautions. All questions answered.   --  I discussed with patient and/or guardian/caretaker that this evaluation in the ED does not suggest any emergent or life threatening medical condition requiring admission or further immediate intervention or diagnostics. Regardless, an unremarkable evaluation in the ED does not preclude the development or presence of a serious or life threatening condition. As such, patient was instructed to return for any worsening, new, changed, or concerning symptoms.     I had a detailed discussion with patient  and/or guardian/caretaker regarding findings, plan, return precautions, importance of medication adherence, need for appropriate follow-up with a PCP.     Management decisions for this encounter made during acute wave of the COVID-19 public health emergency which has severely strained healthcare and community resources. Available resources, standards for appropriate emergency department evaluation, and admission vs. discharge standards have necessarily shifted and remain dynamic.     Note was created using voice recognition software. It may have occasional typographical errors not identified and edited despite initial review prior to signing.      ED Course as of 12/29/21 2117   Tue Dec 28, 2021   2337 Fevers, chiolls since yesterday.   No sick contacts  Not vaccinated [RC]      ED Course User Index  [RC] Christian Garcai MD       Medications - No data to display         Procedures        Diagnostic Impression:    1. COVID-19 virus infection         ED Disposition Condition    Discharge Good        ED Prescriptions     Medication Sig Dispense Start Date End Date Auth. Provider    albuterol (PROVENTIL/VENTOLIN HFA) 90 mcg/actuation inhaler Inhale 1-2 puffs into the lungs every 6 (six) hours as needed for Wheezing or Shortness of Breath (Please dispense with spacer). 6.7 g  12/28/2021 1/27/2022 Christian Garcia MD    naproxen (NAPROSYN) 500 MG tablet Take 1 tablet (500 mg total) by mouth 2 (two) times daily as needed (pain). 60 tablet 12/28/2021  Christian Garcia MD    acetaminophen (TYLENOL) 325 MG tablet Take 2 tablets (650 mg total) by mouth every 6 (six) hours as needed for Pain or Temperature greater than (100.3). 30 tablet 12/28/2021  Christian Garcia MD    benzonatate (TESSALON) 100 MG capsule Take 1 capsule (100 mg total) by mouth 3 (three) times daily as needed for Cough. 20 capsule 12/28/2021 1/7/2022 Christian Garcia MD    ondansetron (ZOFRAN-ODT) 4 MG TbDL Take 1 tablet (4 mg total) by mouth every 6 (six) hours as needed (nausea or vomiting). 12 tablet 12/28/2021  Christian Garcia MD        Follow-up Information     Follow up With Specialties Details Why Contact Info Additional Information    [facility] Internal Medicine Call in 1 day To start seeing a primary care doctor, if you do not have one [address] [facility] [address] [phone] Internal Medicine - [facility], 8th Floor, [address] Please park in [place] and use [place] elevators    [facility]  Call  To start seeing a primary care doctor, if you do not have one [address] [facility] [address]  [phone]              Christian Garcia MD  12/29/21 5741

## 2021-12-31 ENCOUNTER — HOSPITAL ENCOUNTER (EMERGENCY)
Facility: OTHER | Age: 46
Discharge: HOME | End: 2021-12-31
Attending: EMERGENCY MEDICINE
Payer: MEDICAID

## 2021-12-31 VITALS
OXYGEN SATURATION: 99 % | HEART RATE: 84 BPM | DIASTOLIC BLOOD PRESSURE: 106 MMHG | RESPIRATION RATE: 15 BRPM | SYSTOLIC BLOOD PRESSURE: 170 MMHG | TEMPERATURE: 99 F

## 2021-12-31 DIAGNOSIS — J40 BRONCHITIS: ICD-10-CM

## 2021-12-31 DIAGNOSIS — U07.1 COVID-19: ICD-10-CM

## 2021-12-31 DIAGNOSIS — R05.9 COUGH: Primary | ICD-10-CM

## 2021-12-31 DIAGNOSIS — R03.0 ELEVATED BLOOD PRESSURE READING: ICD-10-CM

## 2021-12-31 PROCEDURE — 63600175 PHARM REV CODE 636 W HCPCS: Performed by: EMERGENCY MEDICINE

## 2021-12-31 PROCEDURE — 99284 EMERGENCY DEPT VISIT MOD MDM: CPT | Mod: 25

## 2021-12-31 PROCEDURE — 96372 THER/PROPH/DIAG INJ SC/IM: CPT

## 2021-12-31 RX ORDER — DEXAMETHASONE SODIUM PHOSPHATE 4 MG/ML
8 INJECTION, SOLUTION INTRA-ARTICULAR; INTRALESIONAL; INTRAMUSCULAR; INTRAVENOUS; SOFT TISSUE
Status: COMPLETED | OUTPATIENT
Start: 2021-12-31 | End: 2021-12-31

## 2021-12-31 RX ADMIN — DEXAMETHASONE SODIUM PHOSPHATE 8 MG: 4 INJECTION INTRA-ARTICULAR; INTRALESIONAL; INTRAMUSCULAR; INTRAVENOUS; SOFT TISSUE at 08:12

## 2022-01-01 NOTE — ED PROVIDER NOTES
CHIEF COMPLAINT:   Chief Complaint   Patient presents with    URI     Pt tested positive for COVID on 12/28.  Pt states that he has bronchitis and wants to be treated for it.  Endorses dry cough.         HISTORY OF PRESENT ILLNESS: Jose A Sweet who is a 46 y.o. presents to the emergency department today with complaint of persistent cough. Patient was seen 2 days previously with positive COVID result, discharged with albuterol, Tessalon, naproxen, Zofran.  Patient reports continued cough without shortness of breath.  Patient is not a smoker, however lives in contact with a cigarette smoker, endorses history of bronchitis, denies wheezing.    REVIEW OF SYSTEMS:  Constitutional: - fever, +chills.  Eyes: No discharge. No pain.  HENT: no nasal congestion, -anosmia; No sore throat.   Cardiovascular: No chest pain, no palpitations.  Respiratory: +cough, -shortness of breath.  Gastrointestinal: no nausea, no diarrhea, No abdominal pain, no vomiting.   Genitourinary: No hematuria, dysuria, urgency.  Musculoskeletal: No back pain.  Skin: No rashes, no lesions.  Neurological: -headache, no focal weakness.    Otherwise remaining ROS negative     ALLERGIES REVIEWED  MEDICATIONS REVIEWED  PMH/PSH/SOC/FH REVIEWED     The history is provided by the patient.    Nursing/Ancillary staff note reviewed.        PHYSICAL EXAM:  VS reviewed  Vitals:    12/31/21 1816   BP: (!) 170/106   Pulse: 84   Resp: 15   Temp: 99.1 °F (37.3 °C)       A full-contact physical exam was not possible due to the clinical condition of the patient due to suspected covid 19 pandemic and the necessity to minimize exposure.     General Appearance: The patient is alert, has no immediate or signs of toxicity. No acute distress.    HEENT:  Extra ocular movements intact. No drainage.   Neck: No stridor.   Respiratory: No increased work of breathing, speaking in full sentences  Gastrointestinal:   No guarding  Neurological: Alert and appropriate, moving all  extremities spontaneously  Skin: Warm and dry, no rashes.  Musculoskeletal: Extremities without notable edema, appropriate ROM      Past Medical History:   Diagnosis Date    Hypertension     Seizures          No past surgical history on file.      ED COURSE:     Patient presenting with general illness symptoms; appears well and nontoxic. Exam grossly unremarkable at this time.    DIFFERENTIAL DIAGNOSIS: After history and physical exam a differential diagnosis was considered, but was not limited to,   Sepsis, meningitis, bacterial sinusitis, allergic rhinitis, influenza, COVID19, bacterial/viral pharyngitis, bacterial/viral pneumonia.    Patient already known to be positive for COVID.  No ambulatory hypoxia here in the emergency department, chest x-ray obtained without focal infiltrate.   Instructed patient on symptomatic treatment and increase oral hydration as well as need for quarantine. Vital signs did not indicate sepsis and patient was welling appearing, okay for discharge home.    Management decisions for this encounter made amidst early, highly dynamic phase of COVID19 public health emergency; workup standards and admission vs. discharge standards have necessarily shifted.     Doubt PNA, sepsis, other serious bacterial infection at this time to warrant admission. The patient s otherwise well-appearing with acceptable vitals, a reassuring physical exam,  and lacks serious medical comorbidities that would require admission and desiring to trial home treatment. Patient is nontoxic and although symptomatic, otherwise safe to go home at this time. Will provide strict return precautions and instructions on self-isolation/quarantine and anticipatory guidance.     The patient's blood pressure is elevated here in the emergency department.  The patient has a history of hypertension and this is likely long-standing uncontrolled hypertension.  Based on the patient's presentation today I do not see any signs of endorgan  damage representing hypertensive emergency.  I do not think the patient's presentation necessitates further intervention in the Emergency Department to acutely decrease their blood pressure.  I have given the patient and/or their family specific return precautions and instructions to follow up with their regular doctor.       IMPRESSION  The primary encounter diagnosis was Cough. Diagnoses of COVID-19, Bronchitis, and Elevated blood pressure reading were also pertinent to this visit. Strict instructions to follow up with primary care physician or reference provided for further assessment and evaluation. Given instructions to return for any acute symptoms and verbalized understanding of this medical plan.      Management decisions for this encounter made during acute wave of the COVID-19 public health emergency which has severely strained healthcare and community resources. Available resources, standards for appropriate emergency department evaluation, and admission vs. discharge standards have necessarily shifted and remain dynamic.                    Caitlin Christianson MD  01/01/22 0047

## 2022-01-02 ENCOUNTER — HOSPITAL ENCOUNTER (EMERGENCY)
Facility: OTHER | Age: 47
Discharge: HOME | End: 2022-01-03
Attending: EMERGENCY MEDICINE
Payer: MEDICAID

## 2022-01-02 VITALS
BODY MASS INDEX: 35.55 KG/M2 | TEMPERATURE: 98 F | WEIGHT: 240 LBS | OXYGEN SATURATION: 97 % | DIASTOLIC BLOOD PRESSURE: 97 MMHG | HEIGHT: 69 IN | RESPIRATION RATE: 20 BRPM | HEART RATE: 89 BPM | SYSTOLIC BLOOD PRESSURE: 171 MMHG

## 2022-01-02 DIAGNOSIS — J40 BRONCHITIS DUE TO COVID-19 VIRUS: Primary | ICD-10-CM

## 2022-01-02 DIAGNOSIS — U07.1 BRONCHITIS DUE TO COVID-19 VIRUS: Primary | ICD-10-CM

## 2022-01-02 PROCEDURE — 99283 EMERGENCY DEPT VISIT LOW MDM: CPT

## 2022-01-02 RX ORDER — CODEINE PHOSPHATE AND GUAIFENESIN 10; 100 MG/5ML; MG/5ML
5 SOLUTION ORAL EVERY 4 HOURS PRN
Qty: 118 ML | Refills: 0 | Status: SHIPPED | OUTPATIENT
Start: 2022-01-02 | End: 2022-01-12

## 2022-01-03 NOTE — ED PROVIDER NOTES
"     Source of History:  pt    Chief complaint:  COVID-19 Concerns (Pt reports covid positive with shortness of breath x 12 hours. Pt able to speak in complete sentences in Triage. )      HPI:  Jose A Sweet is a 46 y.o. male presenting with continued cough, congestion in chest for past week. Reports dx''d w/ covid then.  Symptoms not improving.  Denies f, sob w/ exertion.  Using his inhaler, tylenol, tessalon, and mucinex.  No v/d.  No exertional c/p.  No other acute c/o      This is the extent to the patients complaints today here in the emergency department.    ROS: As per HPI and below:  General: No fever.  No chills.  Eyes: No visual changes.   ENT: No sore throat. No ear pain.  Urinary: No abnormal urination.  MSK: No back pain. No joint pain.   Integument: No rashes or lesions.      Review of patient's allergies indicates:  No Known Allergies    PMH:  As per HPI and below:  Past Medical History:   Diagnosis Date    Hypertension     Seizures      No past surgical history on file.    Social History     Tobacco Use    Smoking status: Never Smoker    Smokeless tobacco: Never Used   Substance Use Topics    Alcohol use: No       Physical Exam:    BP (!) 171/97 (BP Location: Left arm, Patient Position: Sitting)   Pulse 89   Temp 98.2 °F (36.8 °C) (Oral)   Resp 20   Ht 5' 9" (1.753 m)   Wt 108.9 kg (240 lb)   SpO2 97%   BMI 35.44 kg/m²   Nursing note and vital signs reviewed.  Appearance: No acute distress. Speaks in full sentences. Not acutely ill appearing.  Eyes: No conjunctival injection. No pallor/icterus  ENT: Normal phonation.  MMM.    Cardiac:  Regular rate rhythm, normal S1-S2.  2+ pulses.  Respiratory:  Clear to auscultation bilaterally.  No wheezes rales or rhonchi.  Good air exchange.  Ambulates through the emergency department without tachypnea.  Musculoskeletal: Good range of motion all joints.  No deformities.  Neck supple.  No meningismus. Neurovascularly intact.  Skin: No rashes seen.  " Good turgor.  No abrasions.  No ecchymoses.  Mental Status:  Alert and oriented x 3.  Appropriate, conversant.    Labs that have been ordered have been independently reviewed and interpreted by myself.    I decided to obtain the patient's medical records.  Summary of Medical Records:  Recent visits for similar symptoms.  Recent negative chest x-ray.  Administered steroids last visit 2 days ago.        MDM:    46 y.o. male with history of bronchitis, recent COVID infection presents with ongoing cough and chest congestion.  On exam he is well-appearing.  Good oxygenation.  Normal pulse ox.  Afebrile.  He reports no improvement with multiple medications that have recently been tried.  Encouraged to continue these and will give a prescription for codeine containing cough syrup for improved symptom control.  I do not think he needs repeat imaging.  Discussed continued symptomatic care and return precautions                 Diagnostic Impression:    1. Bronchitis due to COVID-19 virus         ED Disposition Condition    Discharge Stable          ED Prescriptions     Medication Sig Dispense Start Date End Date Auth. Provider    guaiFENesin-codeine 100-10 mg/5 ml (TUSSI-ORGANIDIN NR)  mg/5 mL syrup Take 5 mLs by mouth every 4 (four) hours as needed for Cough. 118 mL 1/2/2022 1/12/2022 Yan Diallo II, MD        Follow-up Information     Follow up With Specialties Details Why Contact Info    [facility]  Schedule an appointment as soon as possible for a visit in 5 days             Yan Diallo II, MD  01/02/22 6961

## 2022-01-13 ENCOUNTER — NURSE TRIAGE (OUTPATIENT)
Dept: ADMINISTRATIVE | Facility: CLINIC | Age: 47
End: 2022-01-13
Payer: MEDICAID

## 2022-01-13 ENCOUNTER — HOSPITAL ENCOUNTER (EMERGENCY)
Facility: OTHER | Age: 47
Discharge: HOME | End: 2022-01-13
Attending: EMERGENCY MEDICINE
Payer: MEDICAID

## 2022-01-13 VITALS
TEMPERATURE: 98 F | HEIGHT: 69 IN | SYSTOLIC BLOOD PRESSURE: 167 MMHG | WEIGHT: 240 LBS | DIASTOLIC BLOOD PRESSURE: 98 MMHG | HEART RATE: 67 BPM | RESPIRATION RATE: 18 BRPM | OXYGEN SATURATION: 96 % | BODY MASS INDEX: 35.55 KG/M2

## 2022-01-13 DIAGNOSIS — R05.9 COUGH: Primary | ICD-10-CM

## 2022-01-13 PROCEDURE — 63600175 PHARM REV CODE 636 W HCPCS: Performed by: EMERGENCY MEDICINE

## 2022-01-13 PROCEDURE — 99284 EMERGENCY DEPT VISIT MOD MDM: CPT | Mod: 25

## 2022-01-13 PROCEDURE — 96372 THER/PROPH/DIAG INJ SC/IM: CPT

## 2022-01-13 RX ORDER — DEXAMETHASONE SODIUM PHOSPHATE 4 MG/ML
8 INJECTION, SOLUTION INTRA-ARTICULAR; INTRALESIONAL; INTRAMUSCULAR; INTRAVENOUS; SOFT TISSUE
Status: COMPLETED | OUTPATIENT
Start: 2022-01-13 | End: 2022-01-13

## 2022-01-13 RX ORDER — AZITHROMYCIN 250 MG/1
250 TABLET, FILM COATED ORAL DAILY
Qty: 6 TABLET | Refills: 0 | Status: SHIPPED | OUTPATIENT
Start: 2022-01-13 | End: 2024-04-02

## 2022-01-13 RX ADMIN — DEXAMETHASONE SODIUM PHOSPHATE 8 MG: 4 INJECTION INTRA-ARTICULAR; INTRALESIONAL; INTRAMUSCULAR; INTRAVENOUS; SOFT TISSUE at 01:01

## 2022-01-13 NOTE — ED PROVIDER NOTES
Encounter Date: 1/13/2022       History     Chief Complaint   Patient presents with    Cough     Cough x2 months     46-year-old male presents complaining of a cough that has been persistent for approximately 2 months and is productive of whitish colored sputum.  He states the coughing occurs throughout the day but is worse at night.  He denies any associated fever, chills, myalgias.  He has had multiple emergency department visits over the span of the past 3 weeks and states that his cough today remained unchanged compared to his most recent ED visit.  He has been taking in the Tessalon Perles and using the albuterol inhaler that was prescribed to him during 1 of these visits with no relief.  He states that this happens around the same time every year for the past 3 or 4 years.        Review of patient's allergies indicates:  No Known Allergies  Past Medical History:   Diagnosis Date    Hypertension     Seizures      History reviewed. No pertinent surgical history.  History reviewed. No pertinent family history.  Social History     Tobacco Use    Smoking status: Never Smoker    Smokeless tobacco: Never Used   Substance Use Topics    Alcohol use: No    Drug use: Never     Review of Systems   Constitutional: Negative for chills and fever.   Respiratory: Positive for cough. Negative for shortness of breath and wheezing.    Cardiovascular: Negative for chest pain.   Gastrointestinal: Negative for abdominal pain, diarrhea, nausea and vomiting.   Genitourinary: Negative for dysuria.   Musculoskeletal: Negative for myalgias.   Neurological: Negative for dizziness and weakness.   All other systems reviewed and are negative.      Physical Exam     Initial Vitals [01/13/22 0007]   BP Pulse Resp Temp SpO2   (!) 202/93 72 18 98.1 °F (36.7 °C) 97 %      MAP       --         Physical Exam    Nursing note and vitals reviewed.  Constitutional: He appears well-developed and well-nourished. He is not diaphoretic. No distress.    HENT:   Head: Normocephalic and atraumatic.   Right Ear: External ear normal.   Left Ear: External ear normal.   Eyes: Conjunctivae and EOM are normal. Right eye exhibits no discharge. Left eye exhibits no discharge.   Cardiovascular: Normal rate, regular rhythm and normal heart sounds. Exam reveals no gallop and no friction rub.    No murmur heard.  Pulmonary/Chest: Breath sounds normal. No respiratory distress. He has no wheezes. He has no rhonchi. He has no rales.   Musculoskeletal:         General: Normal range of motion.     Neurological: He is alert and oriented to person, place, and time. GCS score is 15. GCS eye subscore is 4. GCS verbal subscore is 5. GCS motor subscore is 6.   Psychiatric: He has a normal mood and affect. Thought content normal.         ED Course   Procedures  Labs Reviewed - No data to display       Imaging Results    None          Medications   dexamethasone injection 8 mg (8 mg Intramuscular Given 1/13/22 0141)        Additional MDM:   Comments: Patient presents complaining of a persistent cough.  Triage vital signs significant for an elevated blood pressure but otherwise within normal limits.  Physical exam is unremarkable.  I did review his medical records and he did have an x-ray within the past 3 weeks.  He also tested positive for COVID within the past 3 weeks.  I do not feel that repeating a chest x-ray today is necessary given that he does not report any new or worsening symptoms.  Given the duration of his symptoms, and lack of improvement with previous interventions, I did discuss with him the plan to treat him with Z-Javon although I suspect that this may still be viral in nature.  He was instructed to follow-up with his PCP in 1 week at the completion of antibiotics for re-evaluation..                    Clinical Impression:   Final diagnoses:  [R05.9] Cough (Primary)          ED Disposition Condition    Discharge Stable        ED Prescriptions     Medication Sig Dispense  Start Date End Date Auth. Provider    azithromycin (Z-MICH) 250 MG tablet Take 1 tablet (250 mg total) by mouth once daily. Take first 2 tablets together, then 1 every day until finished. 6 tablet 1/13/2022  Rosa Maria Shepard MD        Follow-up Information     Follow up With Specialties Details Why Contact Info    primary care doctor  Schedule an appointment as soon as possible for a visit in 1 week for re-evaluation            Rosa Maria Shepard MD  01/13/22 8146

## 2022-01-13 NOTE — TELEPHONE ENCOUNTER
Pt was treated in ED     Reason for Disposition   Caller has already spoken with another triager or PCP (or office), and has further questions and triager able to answer questions.    Protocols used: NO CONTACT OR DUPLICATE CONTACT CALL-A-OH

## 2022-01-20 ENCOUNTER — PES CALL (OUTPATIENT)
Dept: ADMINISTRATIVE | Facility: CLINIC | Age: 47
End: 2022-01-20
Payer: MEDICAID

## 2022-01-28 ENCOUNTER — PES CALL (OUTPATIENT)
Dept: ADMINISTRATIVE | Facility: CLINIC | Age: 47
End: 2022-01-28
Payer: MEDICAID

## 2024-03-30 ENCOUNTER — HOSPITAL ENCOUNTER (EMERGENCY)
Facility: OTHER | Age: 49
Discharge: HOME | End: 2024-03-30
Attending: EMERGENCY MEDICINE
Payer: MEDICAID

## 2024-03-30 VITALS
WEIGHT: 235 LBS | BODY MASS INDEX: 31.83 KG/M2 | DIASTOLIC BLOOD PRESSURE: 76 MMHG | RESPIRATION RATE: 14 BRPM | SYSTOLIC BLOOD PRESSURE: 157 MMHG | HEART RATE: 63 BPM | HEIGHT: 72 IN | TEMPERATURE: 99 F | OXYGEN SATURATION: 98 %

## 2024-03-30 DIAGNOSIS — R05.9 COUGH: ICD-10-CM

## 2024-03-30 DIAGNOSIS — J06.9 VIRAL URI WITH COUGH: Primary | ICD-10-CM

## 2024-03-30 LAB
CTP QC/QA: YES
CTP QC/QA: YES
GROUP A STREP, MOLECULAR: NEGATIVE
POC MOLECULAR INFLUENZA A AGN: NEGATIVE
POC MOLECULAR INFLUENZA B AGN: NEGATIVE
SARS-COV-2 RDRP RESP QL NAA+PROBE: NEGATIVE

## 2024-03-30 PROCEDURE — 87651 STREP A DNA AMP PROBE: CPT | Performed by: EMERGENCY MEDICINE

## 2024-03-30 PROCEDURE — 25000003 PHARM REV CODE 250: Performed by: EMERGENCY MEDICINE

## 2024-03-30 PROCEDURE — 99283 EMERGENCY DEPT VISIT LOW MDM: CPT | Mod: 25

## 2024-03-30 PROCEDURE — 87635 SARS-COV-2 COVID-19 AMP PRB: CPT | Performed by: INTERNAL MEDICINE

## 2024-03-30 RX ORDER — IBUPROFEN 400 MG/1
800 TABLET ORAL
Status: COMPLETED | OUTPATIENT
Start: 2024-03-30 | End: 2024-03-30

## 2024-03-30 RX ORDER — OXYMETAZOLINE HCL 0.05 %
1 SPRAY, NON-AEROSOL (ML) NASAL
Status: COMPLETED | OUTPATIENT
Start: 2024-03-30 | End: 2024-03-30

## 2024-03-30 RX ADMIN — IBUPROFEN 800 MG: 400 TABLET ORAL at 12:03

## 2024-03-30 RX ADMIN — Medication 1 SPRAY: at 12:03

## 2024-03-30 NOTE — ED TRIAGE NOTES
Jose A Sweet, a 48 y.o. male presents to the ED with throat soreness, cough, congestion in chest, stuffy nose for x2 days. Pt has been taking Mucinex DM without relief. Denies SOB, chest pain. 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 Pt states BP always is higher in ED and checks at home frequently and systolic is normally in 130s.    Pt resting comfortably. Connected to cardiac monitor, BP cuff, and pulse oximeter. ED workup in progress. Call light within reach. Safety measures in place. Denies further needs. Plan of care ongoing.      Chief Complaint   Patient presents with    Cough     Worsening cough, chest and nasal congestion and throat irritation x yesterday. Denies N/V/D, CP, or SOB.     Review of patient's allergies indicates:  No Known Allergies  Past Medical History:   Diagnosis Date    Hypertension     Seizures      No past surgical history on file.
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
## 2024-04-02 ENCOUNTER — HOSPITAL ENCOUNTER (EMERGENCY)
Facility: OTHER | Age: 49
Discharge: HOME | End: 2024-04-02
Attending: EMERGENCY MEDICINE
Payer: MEDICAID

## 2024-04-02 VITALS
OXYGEN SATURATION: 98 % | RESPIRATION RATE: 17 BRPM | HEIGHT: 72 IN | WEIGHT: 230 LBS | SYSTOLIC BLOOD PRESSURE: 175 MMHG | TEMPERATURE: 98 F | DIASTOLIC BLOOD PRESSURE: 88 MMHG | HEART RATE: 63 BPM | BODY MASS INDEX: 31.15 KG/M2

## 2024-04-02 DIAGNOSIS — R06.02 SHORTNESS OF BREATH: ICD-10-CM

## 2024-04-02 DIAGNOSIS — J06.9 VIRAL URI WITH COUGH: Primary | ICD-10-CM

## 2024-04-02 DIAGNOSIS — I10 UNCONTROLLED HYPERTENSION: ICD-10-CM

## 2024-04-02 DIAGNOSIS — R05.9 COUGH: ICD-10-CM

## 2024-04-02 DIAGNOSIS — R03.0 ELEVATED BLOOD PRESSURE READING: ICD-10-CM

## 2024-04-02 LAB
OHS QRS DURATION: 88 MS
OHS QTC CALCULATION: 385 MS

## 2024-04-02 PROCEDURE — 96372 THER/PROPH/DIAG INJ SC/IM: CPT | Performed by: EMERGENCY MEDICINE

## 2024-04-02 PROCEDURE — 99284 EMERGENCY DEPT VISIT MOD MDM: CPT | Mod: 25

## 2024-04-02 PROCEDURE — 63600175 PHARM REV CODE 636 W HCPCS: Performed by: EMERGENCY MEDICINE

## 2024-04-02 PROCEDURE — 93005 ELECTROCARDIOGRAM TRACING: CPT

## 2024-04-02 PROCEDURE — 93010 ELECTROCARDIOGRAM REPORT: CPT | Mod: ,,, | Performed by: INTERNAL MEDICINE

## 2024-04-02 RX ORDER — ALBUTEROL SULFATE 90 UG/1
2 AEROSOL, METERED RESPIRATORY (INHALATION) EVERY 4 HOURS PRN
Qty: 18 G | Refills: 0 | Status: SHIPPED | OUTPATIENT
Start: 2024-04-02 | End: 2024-05-02

## 2024-04-02 RX ORDER — DEXAMETHASONE SODIUM PHOSPHATE 4 MG/ML
8 INJECTION, SOLUTION INTRA-ARTICULAR; INTRALESIONAL; INTRAMUSCULAR; INTRAVENOUS; SOFT TISSUE
Status: COMPLETED | OUTPATIENT
Start: 2024-04-02 | End: 2024-04-02

## 2024-04-02 RX ADMIN — DEXAMETHASONE SODIUM PHOSPHATE 8 MG: 4 INJECTION INTRA-ARTICULAR; INTRALESIONAL; INTRAMUSCULAR; INTRAVENOUS; SOFT TISSUE at 03:04

## 2024-04-02 NOTE — ED PROVIDER NOTES
Encounter Date: 4/2/2024       History     Chief Complaint   Patient presents with    chest cold     Productive cough of white sputum, was here 4 days ago for same, denies fever, chills, N/V    Cough     48-year-old male with hypertension and seizure disorder presents via personal transportation to [facility] with cough productive of clear sputum associated with mild shortness of breath.  Patient states his chest feels congested.  Symptoms started about 4 days ago, initially with rhinorrhea as well, though this has resolved.  Patient was seen here 3/30/24 and had negative flu, COVID-19, and strep testing.  He was d/ce'd on oxymetazoline nasal spray which has helped with rhinorrhea.  Patient has been using Mucinex without relief.  No fevers/chills.    Patient notes that while blood pressure is elevated here, he checks his blood pressure regularly at home, and it is typically 120s-130s systolic.  He notes that he usually has elevated blood pressure in healthcare settings.      Review of patient's allergies indicates:  No Known Allergies  Past Medical History:   Diagnosis Date    Hypertension     Seizures      History reviewed. No pertinent surgical history.  History reviewed. No pertinent family history.  Social History     Tobacco Use    Smoking status: Never    Smokeless tobacco: Never   Substance Use Topics    Alcohol use: No    Drug use: Never     Review of Systems   Constitutional:  Negative for chills and fever.   HENT:  Positive for rhinorrhea. Negative for sore throat.    Eyes:  Negative for visual disturbance.   Respiratory:  Positive for cough and shortness of breath.    Cardiovascular:  Negative for chest pain, palpitations and leg swelling.   Gastrointestinal:  Negative for abdominal pain.   Genitourinary:  Negative for dysuria.   Musculoskeletal:  Negative for gait problem.   Skin:  Negative for rash.   Neurological:  Negative for syncope.       Physical Exam     Initial Vitals [04/02/24 0052]    BP Pulse Resp Temp SpO2   (!) 211/93 72 15 98.2 °F (36.8 °C) 95 %      MAP       --         Physical Exam    Nursing note and vitals reviewed.  Constitutional: He appears well-developed and well-nourished. He is not diaphoretic.   Awake, alert, nontoxic, speaking in complete sentences.    HENT:   Head: Normocephalic and atraumatic.   Mouth/Throat: Oropharynx is clear and moist.   Eyes: Conjunctivae and EOM are normal. Pupils are equal, round, and reactive to light.   Neck: Neck supple.   Normal range of motion.  Cardiovascular:  Normal rate, regular rhythm and intact distal pulses.           Pulmonary/Chest: Breath sounds normal. No respiratory distress. He has no wheezes. He has no rhonchi. He has no rales.   Intermittent cough.   Abdominal: Abdomen is soft. There is no abdominal tenderness.   Musculoskeletal:         General: No tenderness or edema. Normal range of motion.      Cervical back: Normal range of motion and neck supple.     Neurological: He is alert and oriented to person, place, and time. He has normal strength.   Moving all extremities   Skin: Skin is warm and dry.   Psychiatric: He has a normal mood and affect.         ED Course   Procedures  Labs Reviewed - No data to display  EKG Readings: (Independently Interpreted)   02:45: Sinus bradycardia, HR 52. Normal axis. TWI in II, III, aVF, V4-V6. No ectopy. No STEMI.       Imaging Results              X-Ray Chest PA And Lateral (Final result)  Result time 04/02/24 01:07:53      Final result by Yvonne Whelan MD (04/02/24 01:07:53)                   Impression:      No acute intrathoracic abnormality.      Electronically signed by: Yvonne Whelan  Date:    04/02/2024  Time:    01:07               Narrative:    EXAMINATION:  CHEST PA AND LATERAL    CLINICAL HISTORY:  Cough, unspecified    TECHNIQUE:  PA and lateral chest radiograph    COMPARISON:  03/30/2024    FINDINGS:  The cardiac silhouette is within normal limits.  There is no focal  consolidation, pneumothorax, or pleural effusion.  Tomball is seen over the left chest.                                       Medications   dexAMETHasone injection 8 mg (8 mg Intramuscular Given 4/2/24 0305)     Medical Decision Making  48-year-old male with cough x 4 days associated with rhinorrhea (resolved) and shortness of breath / chest congestion.  No chest pain.    Ddx includes viral URI such as COVID-19 or influenza, bronchitis, PNA, ACS, other.    EKG no STEMI.  Inferolateral TWIs.    CXR NAD.  No PNA.    Flu and COVID-19 negative 3/30/24 so I did not repeat.    I discussed risks and benefits of steroids with patient and he stated he wished to proceed, so I ordered IM dexamethasone 8mg.  I have also rx'ed PRN albuterol MDI for cough.    I discussed elevated blood pressure with patient.  I have strongly advised him to continue monitoring blood pressure at home and follow up to PMD.  I have told him that his EKG is abnormal, and he needs to see his doctor for this.    D/c'ed.      Amount and/or Complexity of Data Reviewed  Radiology: ordered.  ECG/medicine tests: ordered.    Risk  Prescription drug management.                                      Clinical Impression:  Final diagnoses:  [R05.9] Cough  [R06.02] Shortness of breath  [J06.9] Viral URI with cough (Primary)  [R03.0] Elevated blood pressure reading  [I10] Uncontrolled hypertension          ED Disposition Condition    Discharge Stable          ED Prescriptions       Medication Sig Dispense Start Date End Date Auth. Provider    albuterol (PROVENTIL/VENTOLIN HFA) 90 mcg/actuation inhaler Inhale 2 puffs into the lungs every 4 (four) hours as needed for Wheezing or Shortness of Breath (Please dispense with spacer). 18 g 4/2/2024 5/2/2024 Dee Duke MD          Follow-up Information    None          Dee Duke MD  04/02/24 8025

## 2024-04-16 ENCOUNTER — HOSPITAL ENCOUNTER (EMERGENCY)
Facility: OTHER | Age: 49
Discharge: HOME | End: 2024-04-16
Attending: EMERGENCY MEDICINE
Payer: MEDICAID

## 2024-04-16 VITALS
HEART RATE: 75 BPM | OXYGEN SATURATION: 98 % | SYSTOLIC BLOOD PRESSURE: 173 MMHG | HEIGHT: 72 IN | TEMPERATURE: 98 F | WEIGHT: 230 LBS | DIASTOLIC BLOOD PRESSURE: 91 MMHG | BODY MASS INDEX: 31.15 KG/M2 | RESPIRATION RATE: 20 BRPM

## 2024-04-16 DIAGNOSIS — Z76.0 ENCOUNTER FOR MEDICATION REFILL: ICD-10-CM

## 2024-04-16 DIAGNOSIS — Z76.0 MEDICATION REFILL: Primary | ICD-10-CM

## 2024-04-16 PROCEDURE — 25000003 PHARM REV CODE 250: Performed by: NURSE PRACTITIONER

## 2024-04-16 PROCEDURE — 99283 EMERGENCY DEPT VISIT LOW MDM: CPT

## 2024-04-16 RX ORDER — CARBAMAZEPINE 200 MG/1
200 TABLET ORAL
Status: COMPLETED | OUTPATIENT
Start: 2024-04-16 | End: 2024-04-16

## 2024-04-16 RX ORDER — CARBAMAZEPINE 300 MG/1
300 CAPSULE, EXTENDED RELEASE ORAL 2 TIMES DAILY
Qty: 60 CAPSULE | Refills: 0 | Status: SHIPPED | OUTPATIENT
Start: 2024-04-16 | End: 2024-05-16

## 2024-04-16 RX ADMIN — CARBAMAZEPINE 200 MG: 200 TABLET ORAL at 12:04

## 2024-04-16 NOTE — ED TRIAGE NOTES
Pt requesting medication refill for seizure meds, he has been out for two days. No complaint at this time.

## 2024-04-16 NOTE — ED PROVIDER NOTES
Source of History:  Patient    Chief complaint:  Medication Refill (Pt requesting medication refill for seizure meds, out for the last two days PCP retired. Carbatrol 300mg)      HPI:  Jose A Sweet is a 48 y.o. male presenting with request for refill of his seizure medications, states that he needs his Carbatrol refilled last dose taken 2 days ago.  Reports that he had a seizure yesterday but states his back to baseline today.  Reports that his primary care retired.    This is the extent to the patients complaints today here in the emergency department.    PMH:  As per HPI and below:  Past Medical History:   Diagnosis Date    Hypertension     Seizures      No past surgical history on file.    Social History     Tobacco Use    Smoking status: Never    Smokeless tobacco: Never   Substance Use Topics    Alcohol use: No    Drug use: Never     Review of patient's allergies indicates:  No Known Allergies    ROS: As per HPI and below:  General: No fever, No chills.  ENT: No Cough/congestion   Head:  No headache.    Chest:  No shortness of breath.  Cardiovascular: No chest pain.  Integument: No rashes or lesions.    Physical Exam:    BP (!) 173/91 (BP Location: Left arm)   Pulse 75   Temp 98 °F (36.7 °C) (Oral)   Resp 20   Ht 6' (1.829 m)   Wt 104.3 kg (230 lb)   SpO2 98%   BMI 31.19 kg/m²   Vitals:    04/16/24 1131 04/16/24 1213   BP: (!) 146/88 (!) 173/91   Pulse: 80 75   Resp: 18 20   Temp: 98.2 °F (36.8 °C) 98 °F (36.7 °C)   TempSrc: Oral Oral   SpO2: 99% 98%   Weight: 104.3 kg (230 lb)    Height: 6' (1.829 m)        Nursing note and vital signs reviewed.  Appearance: No acute distress.  Well-appearing, nontoxic  ENT: MM are pink and moist.     Chest/ Respiratory:  Clear to auscultation bilaterally.  Good air movement.  No wheezes.  No rhonchi. No rales. No accessory muscle use.  Cardiovascular:  Regular rate and rhythm.  No murmurs. No gallops. No rubs.  Musculoskeletal: Neck supple.  No meningismus.  Neuro:  alert and oriented x3,  no focal neurological deficits.  Psych: Appropriate, conversant    Labs that have been ordered have been independently reviewed and interpreted by myself.  Labs Reviewed - No data to display    No orders to display         Initial Impression/ Differential Dx:  Differential Diagnosis includes, but is not limited to:  Noncompliance with medication, seizures, medication refill      MDM:    48 y.o. male with need for medication refill, states ran out of his seizure medication 2 days ago, states that he was seizure yesterday.  Patient appears to be in good health at this time, answering questions appropriately.  Will provide 30 day prescription of Carbatrol and provided dose in the emergency department.  Internal medicine referral was placed with the patient and I sent a message to our  to assist with scheduling Internal Medicine.       Diagnostic Impression:    1. Medication refill    2. Encounter for medication refill         ED Disposition Condition    Discharge Stable            ED Prescriptions       Medication Sig Dispense Start Date End Date Auth. Provider    carBAMazepine (CARBATROL) 300 MG CM12 Take 1 capsule (300 mg total) by mouth 2 (two) times daily. 60 capsule 4/16/2024 5/16/2024 Nasreen Ortiz, SUZY          Follow-up Information       Follow up With Specialties Details Why Contact Info    [facility] Emergency Dept Emergency Medicine Go to  If symptoms worsen [address]  [facility] [id number] [phone]                 Nasreen Ortiz, SUZY  04/16/24 0381

## 2024-05-29 ENCOUNTER — TELEPHONE (OUTPATIENT)
Dept: NEUROLOGY | Facility: CLINIC | Age: 49
End: 2024-05-29
Payer: MEDICAID

## 2024-05-29 NOTE — TELEPHONE ENCOUNTER
----- Message from Nabila Kent sent at 5/29/2024  9:29 AM CDT -----  Regarding: Appt  Contact: pt @ [phone]  Pt is calling to get appt, have epilepsy. Asking for a call back

## 2024-06-04 ENCOUNTER — OFFICE VISIT (OUTPATIENT)
Dept: NEUROLOGY | Facility: CLINIC | Age: 49
End: 2024-06-04
Payer: MEDICAID

## 2024-06-04 ENCOUNTER — LAB VISIT (OUTPATIENT)
Dept: LAB | Facility: HOSPITAL | Age: 49
End: 2024-06-04
Attending: PSYCHIATRY & NEUROLOGY
Payer: MEDICAID

## 2024-06-04 VITALS
HEIGHT: 72 IN | WEIGHT: 225.44 LBS | SYSTOLIC BLOOD PRESSURE: 183 MMHG | BODY MASS INDEX: 30.54 KG/M2 | HEART RATE: 57 BPM | DIASTOLIC BLOOD PRESSURE: 101 MMHG

## 2024-06-04 DIAGNOSIS — G40.219 LOCALZ-RLTD SYMPTOMATIC EPILEPSY W CMPLX PART SZ, INTRACT, WO STATUS: Primary | ICD-10-CM

## 2024-06-04 DIAGNOSIS — Z51.81 THERAPEUTIC DRUG MONITORING: ICD-10-CM

## 2024-06-04 DIAGNOSIS — G40.219 LOCALZ-RLTD SYMPTOMATIC EPILEPSY W CMPLX PART SZ, INTRACT, WO STATUS: ICD-10-CM

## 2024-06-04 PROBLEM — I10 ESSENTIAL HYPERTENSION: Status: ACTIVE | Noted: 2024-04-15

## 2024-06-04 PROBLEM — R73.03 PREDIABETES: Status: ACTIVE | Noted: 2024-04-15

## 2024-06-04 PROBLEM — R94.39 ABNORMAL STRESS ECG WITH TREADMILL: Status: ACTIVE | Noted: 2024-04-15

## 2024-06-04 PROBLEM — Z91.89 FRAMINGHAM CARDIAC RISK 10-20% IN NEXT 10 YEARS: Status: ACTIVE | Noted: 2024-04-15

## 2024-06-04 PROBLEM — E78.00 HYPERCHOLESTEROLEMIA: Status: ACTIVE | Noted: 2024-04-15

## 2024-06-04 LAB
ALBUMIN SERPL BCP-MCNC: 4 G/DL (ref 3.5–5.2)
ALP SERPL-CCNC: 76 U/L (ref 55–135)
ALT SERPL W/O P-5'-P-CCNC: 21 U/L (ref 10–44)
ANION GAP SERPL CALC-SCNC: 7 MMOL/L (ref 8–16)
AST SERPL-CCNC: 17 U/L (ref 10–40)
BASOPHILS # BLD AUTO: 0.02 K/UL (ref 0–0.2)
BASOPHILS NFR BLD: 0.6 % (ref 0–1.9)
BILIRUB SERPL-MCNC: 0.3 MG/DL (ref 0.1–1)
BUN SERPL-MCNC: 6 MG/DL (ref 6–20)
CALCIUM SERPL-MCNC: 9.7 MG/DL (ref 8.7–10.5)
CARBAMAZEPINE SERPL-MCNC: 6.9 UG/ML (ref 4–12)
CHLORIDE SERPL-SCNC: 107 MMOL/L (ref 95–110)
CO2 SERPL-SCNC: 27 MMOL/L (ref 23–29)
CREAT SERPL-MCNC: 1 MG/DL (ref 0.5–1.4)
DIFFERENTIAL METHOD BLD: ABNORMAL
EOSINOPHIL # BLD AUTO: 0 K/UL (ref 0–0.5)
EOSINOPHIL NFR BLD: 0.6 % (ref 0–8)
ERYTHROCYTE [DISTWIDTH] IN BLOOD BY AUTOMATED COUNT: 13.6 % (ref 11.5–14.5)
EST. GFR  (NO RACE VARIABLE): >60 ML/MIN/1.73 M^2
GLUCOSE SERPL-MCNC: 103 MG/DL (ref 70–110)
HCT VFR BLD AUTO: 43.1 % (ref 40–54)
HGB BLD-MCNC: 14.5 G/DL (ref 14–18)
IMM GRANULOCYTES # BLD AUTO: 0.01 K/UL (ref 0–0.04)
IMM GRANULOCYTES NFR BLD AUTO: 0.3 % (ref 0–0.5)
LYMPHOCYTES # BLD AUTO: 1.2 K/UL (ref 1–4.8)
LYMPHOCYTES NFR BLD: 35 % (ref 18–48)
MCH RBC QN AUTO: 29.6 PG (ref 27–31)
MCHC RBC AUTO-ENTMCNC: 33.6 G/DL (ref 32–36)
MCV RBC AUTO: 88 FL (ref 82–98)
MONOCYTES # BLD AUTO: 0.4 K/UL (ref 0.3–1)
MONOCYTES NFR BLD: 10.5 % (ref 4–15)
NEUTROPHILS # BLD AUTO: 1.9 K/UL (ref 1.8–7.7)
NEUTROPHILS NFR BLD: 53 % (ref 38–73)
NRBC BLD-RTO: 0 /100 WBC
PLATELET # BLD AUTO: 215 K/UL (ref 150–450)
PMV BLD AUTO: 10.8 FL (ref 9.2–12.9)
POTASSIUM SERPL-SCNC: 4.3 MMOL/L (ref 3.5–5.1)
PROT SERPL-MCNC: 7.3 G/DL (ref 6–8.4)
RBC # BLD AUTO: 4.9 M/UL (ref 4.6–6.2)
SODIUM SERPL-SCNC: 141 MMOL/L (ref 136–145)
WBC # BLD AUTO: 3.54 K/UL (ref 3.9–12.7)

## 2024-06-04 PROCEDURE — 85025 COMPLETE CBC W/AUTO DIFF WBC: CPT | Performed by: PSYCHIATRY & NEUROLOGY

## 2024-06-04 PROCEDURE — 99999 PR PBB SHADOW E&M-EST. PATIENT-LVL III: CPT | Mod: PBBFAC,,, | Performed by: PSYCHIATRY & NEUROLOGY

## 2024-06-04 PROCEDURE — 3077F SYST BP >= 140 MM HG: CPT | Mod: CPTII,,, | Performed by: PSYCHIATRY & NEUROLOGY

## 2024-06-04 PROCEDURE — 80156 ASSAY CARBAMAZEPINE TOTAL: CPT | Performed by: PSYCHIATRY & NEUROLOGY

## 2024-06-04 PROCEDURE — 3008F BODY MASS INDEX DOCD: CPT | Mod: CPTII,,, | Performed by: PSYCHIATRY & NEUROLOGY

## 2024-06-04 PROCEDURE — 80203 DRUG SCREEN QUANT ZONISAMIDE: CPT | Performed by: PSYCHIATRY & NEUROLOGY

## 2024-06-04 PROCEDURE — 1160F RVW MEDS BY RX/DR IN RCRD: CPT | Mod: CPTII,,, | Performed by: PSYCHIATRY & NEUROLOGY

## 2024-06-04 PROCEDURE — 3044F HG A1C LEVEL LT 7.0%: CPT | Mod: CPTII,,, | Performed by: PSYCHIATRY & NEUROLOGY

## 2024-06-04 PROCEDURE — 80053 COMPREHEN METABOLIC PANEL: CPT | Performed by: PSYCHIATRY & NEUROLOGY

## 2024-06-04 PROCEDURE — 1159F MED LIST DOCD IN RCRD: CPT | Mod: CPTII,,, | Performed by: PSYCHIATRY & NEUROLOGY

## 2024-06-04 PROCEDURE — 99204 OFFICE O/P NEW MOD 45 MIN: CPT | Mod: S$PBB,,, | Performed by: PSYCHIATRY & NEUROLOGY

## 2024-06-04 PROCEDURE — 3080F DIAST BP >= 90 MM HG: CPT | Mod: CPTII,,, | Performed by: PSYCHIATRY & NEUROLOGY

## 2024-06-04 PROCEDURE — 99213 OFFICE O/P EST LOW 20 MIN: CPT | Mod: PBBFAC | Performed by: PSYCHIATRY & NEUROLOGY

## 2024-06-04 RX ORDER — ROSUVASTATIN CALCIUM 10 MG/1
10 TABLET, COATED ORAL DAILY
COMMUNITY

## 2024-06-04 NOTE — PROGRESS NOTES
[facility]  Epilepsy Clinic Progress Note      [facility] - NEUROLOGY 7TH [place] [facility], [place]    Date: 6/4/24  Patient Name: Jose A Sweet   MRN: [id number]   PCP: No, Primary Doctor  Referring Provider: Self, Aaareferral    Assessment:   Jose A Sweet is a 48 y.o. male presenting  to establish care for epilepsy.  Obtaining baseline a SM levels with dose adjustments pending results.  Patient currently experiencing approximately 1 seizure per month.  May consider EMU for surgical eval event frequency increases.    Plan:     Problem List Items Addressed This Visit          Neuro    Localz-rltd symptomatic epilepsy w cmplx part sz, intract, wo status - Primary    Relevant Orders    Carbamazepine level, total    Zonisamide level    CBC auto differential    Comprehensive metabolic panel     Other Visit Diagnoses       Therapeutic drug monitoring        Relevant Orders    Carbamazepine level, total    Zonisamide level    CBC auto differential    Comprehensive metabolic panel          I completed education on seizure first aid and safety. I recommended seizure precautions with regards to avoiding unsupervised water recreational activity or bathing in tubs, climbing or working at heights, operation of heavy or dangerous machinery, caution around fire and sources of high heat, as well as any other activity which could put a patient at danger in case of a seizure.  I also reviewed the [place] DMV law and recommended that the patient not drive  for 6 months in the event of breakthrough seizures.    Jhony Meraz MD  [facility]   Department of Neurology    Patient note was created using MModal Dictation.  Any errors in syntax or even information may not have been identified and edited on initial review prior to signing this note.  Subjective:        HPI:   Mr. Jose A Sweet is a 48 y.o. male who presents with a chief complaint of longstanding epilepsy.  Patient  reports focal onset seizures beginning in infancy.  The episodes are described as staring with slowed response time.  The patient is often able to carry on a conversation during the seizures.  Occasionally he extends his right hand over his head.  Sometimes he is able to lie down on the floor during the seizures though he has never had a convulsion.  He does state incidents in which he has had behaviors that he does not recall such as losing large sums of money have occurred during his seizures.  The patient works as an artist doing murals, photography, and jewelry.    Seizure Type: Focal onset  Seizure Etiology: R MTS  Current AEDs: Zonisamide 300 mg BID, Carbamazepine 300  mg BID    The patient is not accompanied by family who contribute to the history. This patient has 1 types of seizure as described below. The patient reports having seizures for years The patient reports to have stable seizure control. The seizure frequency is monthly. The last seizure was 5/24 . The patient does not report side effects from seizure medication.     Seizure Type 1:   Seizure Description: Stares, can sometimes maintain conversation, R arm goes up over head, can sometimes lower himself to floor  Aura: Feels tired morning of seizure, scared feeling  Associated Symptoms:  none  Seizure Frequency: Monthly  Last seizure: 5/30/24    Handedness: right  Seizure Onset Age: Since infancy  Seizure/ Epilepsy Risk Factors: childhood seizure  Birth/Developmental History: normal birth history and Normal developmental history  Seizure Triggers/ Provoking Features: sleep deprivation  Previous Seizure Medications: carbamazepine (Tegretol, CBZ), lamotrigine (Lamictal, LTG), topiramate (Topamax, TPM), and zonisamide (Zonegran, ZNA)  Recent Med Changes: None  Other Treatments: None  Prior Episodes of Status: None  Psychiatric/Behavioral Comorbitidies: None  Surgical Candidacy:  Pending    Prior Studies:  EEG : 7/22- R temporal discharges, bitemporal  slowing  vEEG/ EMU evaluation: Not done  MRI of brain: 7/2022-  R MTS per report  AED levels:  Not done  CT/CTA Scan:  Not done  PET Scan: Not done  Neuropsychological Evaluation: Not done  DEXA Scan: Not done  Other studies: Not done    PAST MEDICAL HISTORY:  Past Medical History:   Diagnosis Date    Hypertension     Seizures        PAST SURGICAL HISTORY:  History reviewed. No pertinent surgical history.    CURRENT MEDS:  Current Outpatient Medications   Medication Sig Dispense Refill    fluticasone (FLONASE) 50 mcg/actuation nasal spray 1 spray (50 mcg total) by Each Nare route 2 (two) times daily as needed for Rhinitis. 15 g 0    rosuvastatin (CRESTOR) 10 MG tablet Take 10 mg by mouth once daily.      zonisamide (ZONEGRAN) 100 MG Cap TK 4 CS PO ONCE D  5    albuterol (PROVENTIL/VENTOLIN HFA) 90 mcg/actuation inhaler Inhale 2 puffs into the lungs every 4 (four) hours as needed for Wheezing or Shortness of Breath (Please dispense with spacer). 18 g 0    carBAMazepine (CARBATROL) 300 MG CM12 Take 1 capsule (300 mg total) by mouth 2 (two) times daily. 60 capsule 0    tamsulosin (FLOMAX) 0.4 mg Cap Take 1 capsule (0.4 mg total) by mouth once daily. 30 capsule 0     No current facility-administered medications for this visit.     ALLERGIES:  Review of patient's allergies indicates:  No Known Allergies    FAMILY HISTORY:  No family history on file.    SOCIAL HISTORY:  Social History     Tobacco Use    Smoking status: Never    Smokeless tobacco: Never   Substance Use Topics    Alcohol use: No    Drug use: Never     Review of Systems:  12 system review of systems is negative except for the symptoms mentioned in HPI.      Objective:     Vitals:    06/04/24 0940   BP: (!) 183/101   Pulse: (!) 57   Weight: 102.3 kg (225 lb 6.7 oz)   Height: 6' (1.829 m)     General: NAD, well nourished   Eyes: no tearing, discharge, no erythema   ENT: moist mucous membranes of the oral cavity, nares patent    Neck: Supple, full range of  motion  Cardiovascular: Warm and well perfused, pulses equal and symmetrical  Lungs: Normal work of breathing, normal chest wall excursions  Skin: No rash, lesions, or breakdown on exposed skin  Psychiatry: Mood and affect are appropriate   Abdomen: soft, non tender, non distended  Extremeties: No cyanosis, clubbing or edema.    Neurological   MENTAL STATUS: Alert and oriented to person, place, and time. Attention and concentration within normal limits. Speech without dysarthria. Recent and remote memory within normal limits   CRANIAL NERVES: Visual fields intact. PERRL. EOMI. Facial sensation intact. Face symmetrical. Hearing grossly intact. Full shoulder shrug bilaterally. Tongue protrudes midline   SENSORY: Sensation is intact to light touch throughout.   MOTOR: Normal bulk and tone. 5/5 deltoid, biceps, triceps, interosseous, hand  bilaterally. 5/5 iliopsoas, knee extension/flexion, foot dorsi/plantarflexion bilaterally.    REFLEXES: Symmetric and 2+ throughout. Toes down.   CEREBELLAR/COORDINATION/GAIT: Gait steady. Normal rapid alternating movements.

## 2024-06-05 LAB — ZONISAMIDE SERPL-MCNC: 4.1 MCG/ML (ref 10–40)

## 2024-11-10 ENCOUNTER — HOSPITAL ENCOUNTER (EMERGENCY)
Facility: OTHER | Age: 49
Discharge: HOME | End: 2024-11-11
Attending: EMERGENCY MEDICINE
Payer: MEDICAID

## 2024-11-10 DIAGNOSIS — J06.9 VIRAL UPPER RESPIRATORY INFECTION: Primary | ICD-10-CM

## 2024-11-10 PROCEDURE — 99283 EMERGENCY DEPT VISIT LOW MDM: CPT

## 2024-11-10 PROCEDURE — 87635 SARS-COV-2 COVID-19 AMP PRB: CPT | Performed by: EMERGENCY MEDICINE

## 2024-11-11 VITALS
HEIGHT: 72 IN | TEMPERATURE: 98 F | DIASTOLIC BLOOD PRESSURE: 88 MMHG | RESPIRATION RATE: 18 BRPM | OXYGEN SATURATION: 99 % | SYSTOLIC BLOOD PRESSURE: 147 MMHG | HEART RATE: 80 BPM | BODY MASS INDEX: 29.53 KG/M2 | WEIGHT: 218 LBS

## 2024-11-11 LAB
CTP QC/QA: YES
CTP QC/QA: YES
POC MOLECULAR INFLUENZA A AGN: NEGATIVE
POC MOLECULAR INFLUENZA B AGN: NEGATIVE
SARS-COV-2 RDRP RESP QL NAA+PROBE: NEGATIVE

## 2024-11-11 RX ORDER — BENZONATATE 100 MG/1
100 CAPSULE ORAL 3 TIMES DAILY PRN
Qty: 20 CAPSULE | Refills: 0 | Status: SHIPPED | OUTPATIENT
Start: 2024-11-11 | End: 2024-11-21

## 2024-11-11 NOTE — ED NOTES
Jose A Sweet, an 48 y.o. male presents to the ED via POV  [id number] p/w/d ambulatory into the ED at his own accord without issue  C/O productive cough over the past 12 hours  OTC medication attempted but still no relief    (-)CP, SOB, Abd pain, dizziness, weakness or NVD  (-)Neuro deficits      Chief Complaint   Patient presents with    Cough     Productive cough X12 hours  Attempted otc medication but still no relief     Review of patient's allergies indicates:  No Known Allergies  Past Medical History:   Diagnosis Date    Hypertension     Seizures

## 2024-11-14 ENCOUNTER — HOSPITAL ENCOUNTER (EMERGENCY)
Facility: OTHER | Age: 49
Discharge: HOME | End: 2024-11-14
Attending: EMERGENCY MEDICINE
Payer: MEDICAID

## 2024-11-14 VITALS
DIASTOLIC BLOOD PRESSURE: 95 MMHG | WEIGHT: 218 LBS | BODY MASS INDEX: 28.89 KG/M2 | OXYGEN SATURATION: 96 % | HEIGHT: 73 IN | TEMPERATURE: 98 F | SYSTOLIC BLOOD PRESSURE: 178 MMHG | RESPIRATION RATE: 16 BRPM | HEART RATE: 64 BPM

## 2024-11-14 DIAGNOSIS — J40 BRONCHITIS: Primary | ICD-10-CM

## 2024-11-14 DIAGNOSIS — R05.2 SUBACUTE COUGH: ICD-10-CM

## 2024-11-14 PROCEDURE — 99284 EMERGENCY DEPT VISIT MOD MDM: CPT | Mod: 25

## 2024-11-14 PROCEDURE — 96372 THER/PROPH/DIAG INJ SC/IM: CPT | Performed by: EMERGENCY MEDICINE

## 2024-11-14 PROCEDURE — 63600175 PHARM REV CODE 636 W HCPCS: Performed by: EMERGENCY MEDICINE

## 2024-11-14 RX ORDER — AZITHROMYCIN 250 MG/1
250 TABLET, FILM COATED ORAL DAILY
Qty: 6 TABLET | Refills: 0 | Status: SHIPPED | OUTPATIENT
Start: 2024-11-14

## 2024-11-14 RX ORDER — DEXAMETHASONE SODIUM PHOSPHATE 4 MG/ML
8 INJECTION, SOLUTION INTRA-ARTICULAR; INTRALESIONAL; INTRAMUSCULAR; INTRAVENOUS; SOFT TISSUE
Status: COMPLETED | OUTPATIENT
Start: 2024-11-14 | End: 2024-11-14

## 2024-11-14 RX ADMIN — DEXAMETHASONE SODIUM PHOSPHATE 8 MG: 4 INJECTION, SOLUTION INTRA-ARTICULAR; INTRALESIONAL; INTRAMUSCULAR; INTRAVENOUS; SOFT TISSUE at 01:11

## 2024-11-14 NOTE — DISCHARGE INSTRUCTIONS
Mr. Sweet,    Thank you for letting me care for you today! It was nice meeting you, and I hope you feel better soon.   If you would like access to your chart and what was done today please utilize the [facility].   Please come back to [facility] for all of your future medical needs.    Our goal in the emergency department is to always give you outstanding care and exceptional service. You may receive a survey by mail or e-mail in the next week regarding your experience in our ED. We would greatly appreciate you completing and returning the survey. Your feedback provides us with a way to recognize our staff who give very good care and it helps us learn how to improve when your experience was below our aspiration of excellence.     Sincerely,    Jaime Farrar MD  Board Certified Emergency Physician

## 2024-11-15 NOTE — ED PROVIDER NOTES
Encounter Date: 11/14/2024       History     Chief Complaint   Patient presents with    URI     Productive cough, sore throat x3 days, states it is difficult to lie flat with respiratory sx. Pt as been taking mucinex and tessalon pearls without relief. Seen in ED 3 days ago for same. Denies fevers, NAD noted.      This is a very pleasant 49 yo man presenting for evaluation of persistent cough despite having been seen for the same recently. He denies fevers but endorses some cough and congestion. He has a scratchy throat and notes annually upon weather change having a similar constellation of symptoms. He has generally receieved an injected medication during such episodes which offered great help but is unsure which.     The history is provided by the patient and medical records.       Review of patient's allergies indicates:  No Known Allergies  Past Medical History:   Diagnosis Date    Hypertension     Seizures      History reviewed. No pertinent surgical history.  No family history on file.  Social History     Tobacco Use    Smoking status: Never    Smokeless tobacco: Never   Substance Use Topics    Alcohol use: No    Drug use: Never     Review of Systems  Constitutional-no fever  HEENT-no congestion  Eyes-no redness  Respiratory-no shortness of breath + cough  Cardio-no chest pain  GI-no abdominal pain  Endocrine-no cold intolerance  -no difficulty urinating  MSK-no myalgias  Skin-no rashes  Allergy-no environmental allergy  Neurologic-, no headache  Hematology-no swollen nodes  Behavioral-no confusion   Physical Exam     Initial Vitals [11/14/24 0059]   BP Pulse Resp Temp SpO2   (!) 195/94 64 16 98 °F (36.7 °C) 96 %      MAP       --         Physical Exam  Constitutional: Well appearing, no distress.  Eyes: Conjunctivae normal.  ENT       Head: Normocephalic, atraumatic.       Nose: Normal external appearance        Mouth/Throat: midline uvula, erythematous oropharynx, + 1 tonsil    Hematological/Lymphatic/Immunilogical: no visible lymphadenopathy   Cardiovascular: Normal rate,   Respiratory: Normal respiratory effort.   Gastrointestinal: non distended   Musculoskeletal: Normal range of motion in all extremities. No obvious deformities or swelling.  Neurologic: Alert, oriented. Normal speech and language. No gross focal neurologic deficits are appreciated.  Skin: Skin is warm, dry. No rash noted.  Psychiatric: Mood and affect are normal.    ED Course   Procedures  Labs Reviewed - No data to display       Imaging Results    None          Medications   dexAMETHasone injection 8 mg (8 mg Intramuscular Given 11/14/24 0147)     Medical Decision Making  Ddx- covid, influenza, URI, bronchitis, allergic rhinitis    Well appearing. Hypertensive but without CP or fever  Hx of response previously to decadron for similar constellation of complaints.  Plan for dc with treatment of bronchitis, return in case of worsening and outpatient followup.     Problems Addressed:  Bronchitis: acute illness or injury  Subacute cough: acute illness or injury    Amount and/or Complexity of Data Reviewed  External Data Reviewed: labs, radiology and notes.     Details: Hx of seizures, bronchitis and hypertension    Risk  OTC drugs.  Prescription drug management.  Parenteral controlled substances.  Diagnosis or treatment significantly limited by social determinants of health.                                      Clinical Impression:  Final diagnoses:  [J40] Bronchitis (Primary)  [R05.2] Subacute cough          ED Disposition Condition    Discharge Stable          ED Prescriptions       Medication Sig Dispense Start Date End Date Auth. Provider    azithromycin (Z-MICH) 250 MG tablet Take 1 tablet (250 mg total) by mouth once daily. Take first 2 tablets together, then 1 every day until finished. 6 tablet 11/14/2024 -- Jaime Farrar MD          Follow-up Information       Follow up With Specialties Details Why Contact  Info    [facility] - Emergency Dept Emergency Medicine Go to  As needed, For a follow up visit about today [address] [facility] [address] [phone]             Jaime Farrar MD  11/14/24 2051

## 2025-01-07 ENCOUNTER — HOSPITAL ENCOUNTER (EMERGENCY)
Facility: OTHER | Age: 50
Discharge: HOME | End: 2025-01-07
Attending: EMERGENCY MEDICINE
Payer: MEDICAID

## 2025-01-07 VITALS
HEART RATE: 105 BPM | OXYGEN SATURATION: 99 % | SYSTOLIC BLOOD PRESSURE: 161 MMHG | WEIGHT: 220 LBS | BODY MASS INDEX: 29.16 KG/M2 | DIASTOLIC BLOOD PRESSURE: 95 MMHG | HEIGHT: 73 IN | RESPIRATION RATE: 18 BRPM | TEMPERATURE: 98 F

## 2025-01-07 DIAGNOSIS — G40.909 SEIZURE DISORDER: Primary | ICD-10-CM

## 2025-01-07 PROCEDURE — 99283 EMERGENCY DEPT VISIT LOW MDM: CPT

## 2025-01-07 RX ORDER — CARBAMAZEPINE 300 MG/1
300 CAPSULE, EXTENDED RELEASE ORAL 2 TIMES DAILY
Qty: 180 CAPSULE | Refills: 0 | Status: SHIPPED | OUTPATIENT
Start: 2025-01-07 | End: 2025-01-15 | Stop reason: SDUPTHER

## 2025-01-07 NOTE — ED PROVIDER NOTES
"Source of History:  Patient, chart    Chief complaint:  Medication Refill (Asking for refill for seizure medication as well as neurology referral. Has hx of epilepsy and his MD retired. Last dose was this morning. )      HPI:  Jose A Sweet is a 49 y.o. male with medical history of epilepsy, hypertension presenting for medication refill.  Patient states he has been trying to get hold of his neurologist for a medication refill but has not received a call back.  Patient also requesting a referral for a new neurologist due to his retiring.  Patient denies any complaints.    This is the extent to the patients complaints today here in the emergency department.    ROS: As per HPI and below:  Constitutional: No fever.  No chills.  Eyes: No visual changes.   ENT: No sore throat. No ear pain.  Urinary: No abnormal urination.  MSK: No back pain. No joint pain.   Integument: No rashes or lesions.    Review of patient's allergies indicates:  No Known Allergies    PMH:  As per HPI and below:  Past Medical History:   Diagnosis Date    Hypertension     Seizures      History reviewed. No pertinent surgical history.    Social History     Tobacco Use    Smoking status: Never    Smokeless tobacco: Never   Substance Use Topics    Alcohol use: No    Drug use: Never       Physical Exam:    BP (!) 161/95   Pulse 105   Temp 98.1 °F (36.7 °C) (Oral)   Resp 18   Ht 6' 1" (1.854 m)   Wt 99.8 kg (220 lb)   SpO2 99%   BMI 29.03 kg/m²   Nursing note and vital signs reviewed.  Constitutional: No acute distress.  Nontoxic  Eyes: No conjunctival injection. Extraocular muscles intact.  ENT: Normal phonation.  Musculoskeletal: Good range of motion all joints.  No deformities.  Neck supple.  No meningismus. Neurovascularly intact.  Skin: No rashes seen.  Good turgor.  No abrasions.  No ecchymoses.  Psych:  Alert and oriented x 3.  Appropriate, conversant.    MDM    Emergent evaluation of a 50 yo male presenting for medication refill.  " Patient states he is out of his carvedilol and has been trying to get in touch with his neurologist for a refill.  Patient also requesting a referral for a new neurologist due to his retiring.  Patient denies any complaints.  On exam pt is A&Ox3. VSS. Nonfebrile and nontoxic appearing.  Mucous membranes pink and moist. Tonsils with no redness, erythema or exudates. Breath sounds clear bilaterally.  Abdomen soft and nontender. No rebound or guarding appreciated on exam.   BS WNL.  Pt speaking in full sentences.  Steady gait appreciated. Cap refill < 3 seconds.      History Acquisition   Additional history was acquired from other historians.  Chart    The patient's list of active medical problems, social history, medications, and allergies as documented per RN staff has been reviewed.     Differential Diagnoses   Based on available information and the initial assessment, the working differential diagnoses considered during this evaluation include but are not limited to epilepsy, seizure disorder, medication noncompliance, others.    Additional Consideration   All available testing was considered during the course of this workup.  Comorbidities taken into consideration during the patient's evaluation and treatment include weight, age.    Social determinants of health were taken into consideration during development of our treatment plan.    Medications - No data to display   ED Course as of 01/07/25 1402   Tue Jan 07, 2025   1354 Patient advised to take medications as prescribed.  Have placed referrals for [facility] as well as [facility] neurology.  Patient advised to follow up for continued care of seizures.  Return precautions discussed.  Patient verbalized understanding of this plan of care.  All questions and concerns addressed.     Patient is hemodynamically stable, vital signs are normal. Discharge instructions given. Return to ED precautions discussed. Follow up as directed. Pt verbalized understanding of this  plan.  Pt is stable for discharge. [RZ]      ED Course User Index  [RZ] Sylwia Gautam NP             CLINICAL IMPRESSION  1. Seizure disorder         ED Disposition Condition    Discharge Stable            Instruction:  I see no indication of an emergent process beyond that addressed during our encounter but have duly counseled the patient/family regarding the need for prompt follow-up as well as the indications that should prompt immediate return to the emergency room should new or worrisome developments occur.  The patient/family has been provided with verbal and printed direction regarding our final diagnosis(es) as well as instructions regarding use of OTC and/or Rx medications intended to manage the patient's aforementioned conditions including:  ED Prescriptions       Medication Sig Dispense Start Date End Date Auth. Provider    carBAMazepine (CARBATROL) 300 MG CM12 Take 1 capsule (300 mg total) by mouth 2 (two) times daily. 180 capsule 1/7/2025 4/7/2025 Sylwia Gautam NP          Patient has been advised of following recommended follow-up instructions:  Follow-up Information       Follow up With Specialties Details Why Contact Info Additional Information    [facility] - Neurology [facility] Neurology Schedule an appointment as soon as possible for a visit   [address], [address]  [facility] [address] [phone] Neuroscience [facility], 7th Floor Please park in [facility] and take Clinic elevator    Neurology/Ms/Stroke, [facility] - Neurology Schedule an appointment as soon as possible for a visit   [address] [facility] [address]  [phone]             The patient/family communicates understanding of all this information and all remaining questions and concerns were addressed at this time.      The patient's condition did not warrant review of the  and prescription of controlled substances.      This note was created using dictation software.   This program may occasionally mistype words and phrases.         Sylwia Gautam, NP  01/07/25 2757

## 2025-01-07 NOTE — DISCHARGE INSTRUCTIONS
You were seen and evaluated in the ER today.  We have refilled your medication and placed a referral for Neurology follow-up.  Please take medications as prescribed.  Please follow-up with your PCP as needed.  Please return to the ED for any worsening symptoms such as chest pain, shortness of breath, fever not controlled with Tylenol or ibuprofen or uncontrolled pain.      Our goal in the emergency department is to always give you outstanding care and exceptional service. You may receive a survey by mail or e-mail in the next week regarding your experience in our ED. We would greatly appreciate your completing and returning the survey. Your feedback provides us with a way to recognize our staff who give very good care and it helps us learn how to improve when your experience was below our aspiration of excellence.

## 2025-01-13 ENCOUNTER — TELEPHONE (OUTPATIENT)
Dept: NEUROLOGY | Facility: CLINIC | Age: 50
End: 2025-01-13
Payer: MEDICAID

## 2025-01-13 NOTE — TELEPHONE ENCOUNTER
I spoke to the patient to ask if he would like to sign up for [employer]. The patient agreed to sign up and a link was sent to his email. I advised the patient that I will check tomorrow to schedule a virtual some time this week with the PA.

## 2025-01-14 NOTE — TELEPHONE ENCOUNTER
I left a message for the patient to let him know a virtual appointment is scheduled for him on tomorrow. The patient was asked to call back with any questions he may have.

## 2025-01-15 ENCOUNTER — OFFICE VISIT (OUTPATIENT)
Dept: NEUROLOGY | Facility: CLINIC | Age: 50
End: 2025-01-15
Payer: MEDICAID

## 2025-01-15 DIAGNOSIS — G40.219 LOCALZ-RLTD SYMPTOMATIC EPILEPSY W CMPLX PART SZ, INTRACT, WO STATUS: ICD-10-CM

## 2025-01-15 DIAGNOSIS — G40.909 SEIZURE DISORDER: Primary | ICD-10-CM

## 2025-01-15 DIAGNOSIS — G40.909 SEIZURE DISORDER: ICD-10-CM

## 2025-01-15 RX ORDER — CARBAMAZEPINE 300 MG/1
300 CAPSULE, EXTENDED RELEASE ORAL 2 TIMES DAILY
Qty: 180 CAPSULE | Refills: 3 | Status: SHIPPED | OUTPATIENT
Start: 2025-01-15 | End: 2025-01-15 | Stop reason: SDUPTHER

## 2025-01-15 RX ORDER — ZONISAMIDE 100 MG/1
200 CAPSULE ORAL NIGHTLY
Qty: 180 CAPSULE | Refills: 3 | Status: SHIPPED | OUTPATIENT
Start: 2025-01-15 | End: 2026-01-10

## 2025-01-15 NOTE — PROGRESS NOTES
Established patient: Hospital follow up    Patient statement: Pt was in the hospital a week ago. Pt states he did have a seizure prior to going to the hospital and says he was out of his medication. He states his seizures feel the same as before, no new or concerning changes      Falls: 0    Medications: up to date    Pharmacy: up to date    Pain Scale: 0

## 2025-01-15 NOTE — PROGRESS NOTES
[facility]  Epilepsy Clinic Progress Note    [facility] - NEUROLOGY 7TH FL  [facility], [place]    Date: 1/15/25  Patient Name: Jose A Sweet   MRN: [id number]   PCP: No, Primary Doctor  Referring Provider: No ref. provider found    The patient location is: Home  The chief complaint leading to consultation is: Epilepsy  Visit type: Virtual visit with synchronous audio and video  Total time spent with patient: 10 minutes  Each patient to whom he or she provides medical services by telemedicine is:  (1) informed of the relationship between the physician and patient and the respective role of any other health care provider with respect to management of the patient; and (2) notified that he or she may decline to receive medical services by telemedicine and may withdraw from such care at any time.    Assessment:   Jose A Sweet is a 49 y.o. 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  Recent breakthrough in the setting of missed medication, otherwise, seizures have been well controlled on carbamazepine 300 mg twice daily and zonisamide 200 mg nightly.  Continue these medications at same doses, refills provided.  Levels next visit.  If events increase in frequency -> consider EMU at that time.  Follow-up in 6-12 months or sooner with issues.  Patient is comfortable with plan.  All questions and concerns are addressed at this time.  Plan:     Problem List Items Addressed This Visit          Neuro    Localz-rltd symptomatic epilepsy w cmplx part sz, intract, wo status     Other Visit Diagnoses       Seizure disorder    -  Primary    Relevant Medications    carBAMazepine (CARBATROL) 300 MG CM12    zonisamide (ZONEGRAN) 100 MG Cap          I completed education on seizure first aid and safety. I recommended seizure precautions with regards to avoiding unsupervised water recreational activity or bathing in tubs, climbing or working at heights,  operation of heavy or dangerous machinery, caution around fire and sources of high heat, as well as any other activity which could put a patient at danger in case of a seizure.  I also reviewed the [place] DMV law and recommended that the patient not drive  for 6 months in the event of breakthrough seizures.    Anastasia Ruiz PA-C   Neurology-Epilepsy  [facility]    Collaborating physician, Dr. Jhony Meraz, was available during today's encounter.     I spent approximately 20 minutes on the day of this encounter preparing to see the patient, obtaining and reviewing history and results, performing a medically appropriate exam, counseling and educating the patient/family/caregiver, documenting clinical information, coordinating care, and ordering medications, tests, procedures, and referrals.    Patient note was created using MModal Dictation.  Any errors in syntax or even information may not have been identified and edited on initial review prior to signing this note.  Subjective:     Interval Events/ROS 1/15/2025:    Current ASM/SEs:   Carbamazepine 300mg BID   Zonisamide 200mg qhs  No side effects  Breakthrough seizures/events: recent breakthrough in the setting of missed medication   Driving: no   Sleep: good    Otherwise, no fever, no cold symptoms, no headache, no changes in vision, no new weakness, no chest pain, no shortness of breath, no nausea, no vomiting, no diarrhea, no constipation, no problems walking. Pt has no other concerns at this time.     Recent Labs   Lab 06/04/24  1034   Zonisamide 4.1 L   Carbamazepine Lvl 6.9         HPI:   Mr. Jose A Sweet is a 49 y.o. male who presents with a chief complaint of longstanding epilepsy.  Patient reports focal onset seizures beginning in infancy.  The episodes are described as staring with slowed response time.  The patient is often able to carry on a conversation during the seizures.  Occasionally he extends his right hand over his head.   Sometimes he is able to lie down on the floor during the seizures though he has never had a convulsion.  He does state incidents in which he has had behaviors that he does not recall such as losing large sums of money have occurred during his seizures.  The patient works as an artist doing murals, photography, and jewelry.    Seizure Type: Focal onset  Seizure Etiology: R MTS  Current AEDs: Zonisamide 300 mg BID, Carbamazepine 300  mg BID    The patient is not accompanied by family who contribute to the history. This patient has 1 types of seizure as described below. The patient reports having seizures for years The patient reports to have stable seizure control. The seizure frequency is monthly. The last seizure was 5/24 . The patient does not report side effects from seizure medication.     Seizure Type 1:   Seizure Description: Stares, can sometimes maintain conversation, R arm goes up over head, can sometimes lower himself to floor  Aura: Feels tired morning of seizure, scared feeling  Associated Symptoms:  none  Seizure Frequency: Monthly  Last seizure: 5/30/24    Handedness: right  Seizure Onset Age: Since infancy  Seizure/ Epilepsy Risk Factors: childhood seizure  Birth/Developmental History: normal birth history and Normal developmental history  Seizure Triggers/ Provoking Features: sleep deprivation  Previous Seizure Medications: carbamazepine (Tegretol, CBZ), lamotrigine (Lamictal, LTG), topiramate (Topamax, TPM), and zonisamide (Zonegran, ZNA)  Recent Med Changes: None  Other Treatments: None  Prior Episodes of Status: None  Psychiatric/Behavioral Comorbitidies: None  Surgical Candidacy:  Pending    Prior Studies:  EEG : 7/22- R temporal discharges, bitemporal slowing  vEEG/ EMU evaluation: Not done  MRI of brain: 7/2022-  R MTS per report  AED levels:  Not done  CT/CTA Scan:  Not done  PET Scan: Not done  Neuropsychological Evaluation: Not done  DEXA Scan: Not done  Other studies: Not done    PAST  MEDICAL HISTORY:  Past Medical History:   Diagnosis Date    Hypertension     Seizures        PAST SURGICAL HISTORY:  No past surgical history on file.    CURRENT MEDS:  Current Outpatient Medications   Medication Sig Dispense Refill    azithromycin (Z-MICH) 250 MG tablet Take 1 tablet (250 mg total) by mouth once daily. Take first 2 tablets together, then 1 every day until finished. 6 tablet 0    carBAMazepine (CARBATROL) 300 MG CM12 Take 1 capsule (300 mg total) by mouth 2 (two) times daily. 180 capsule 0    fluticasone (FLONASE) 50 mcg/actuation nasal spray 1 spray (50 mcg total) by Each Nare route 2 (two) times daily as needed for Rhinitis. 15 g 0    rosuvastatin (CRESTOR) 10 MG tablet Take 10 mg by mouth once daily.      zonisamide (ZONEGRAN) 100 MG Cap TK 4 CS PO ONCE D  5    albuterol (PROVENTIL/VENTOLIN HFA) 90 mcg/actuation inhaler Inhale 2 puffs into the lungs every 4 (four) hours as needed for Wheezing or Shortness of Breath (Please dispense with spacer). 18 g 0    tamsulosin (FLOMAX) 0.4 mg Cap Take 1 capsule (0.4 mg total) by mouth once daily. 30 capsule 0     No current facility-administered medications for this visit.     ALLERGIES:  Review of patient's allergies indicates:  No Known Allergies    FAMILY HISTORY:  No family history on file.    SOCIAL HISTORY:  Social History     Tobacco Use    Smoking status: Never    Smokeless tobacco: Never   Substance Use Topics    Alcohol use: No    Drug use: Never     Review of Systems:  12 system review of systems is negative except for the symptoms mentioned in HPI.      Objective:     There were no vitals filed for this visit.    General: NAD, well nourished   Eyes: no tearing, discharge, no erythema   ENT: moist mucous membranes of the oral cavity, nares patent    Neck: free range of motion  Cardiovascular: well perfused  Lungs: Normal work of breathing, normal chest wall excursions  Skin: No rash, lesions, or breakdown on exposed skin  Psychiatry: Mood and  affect are appropriate   Abdomen: non distended  Extremeties: No cyanosis, clubbing or edema visible    Neurological   MENTAL STATUS: Alert and oriented to person, place, and time. Attention and concentration within normal limits. Speech without dysarthria. Recent and remote memory within normal limits   CRANIAL NERVES: EOMI. Face symmetrical. Hearing grossly intact. Full shoulder shrug bilaterally. Tongue protrudes midline   SENSORY: no subjective deficits  MOTOR: Normal bulk. Moves extremities antigravity x4.     CEREBELLAR/COORDINATION/GAIT: remains seated

## 2025-01-16 RX ORDER — CARBAMAZEPINE 300 MG/1
300 CAPSULE, EXTENDED RELEASE ORAL 2 TIMES DAILY
Qty: 180 CAPSULE | Refills: 3 | Status: SHIPPED | OUTPATIENT
Start: 2025-01-16 | End: 2026-01-11

## 2025-02-03 DIAGNOSIS — G40.909 SEIZURE DISORDER: ICD-10-CM

## 2025-02-06 RX ORDER — CARBAMAZEPINE 300 MG/1
900 CAPSULE, EXTENDED RELEASE ORAL 2 TIMES DAILY
Qty: 540 CAPSULE | Refills: 3 | Status: SHIPPED | OUTPATIENT
Start: 2025-02-06 | End: 2026-02-01

## 2025-03-17 DIAGNOSIS — G40.909 SEIZURE DISORDER: ICD-10-CM

## 2025-03-17 NOTE — TELEPHONE ENCOUNTER
----- Message from Adriana sent at 3/17/2025  8:21 AM CDT -----  Refill request. Pt will be out of medication in 3 days, pt asking if sammy is needed as well carBAMazepine (CARBATROL) 300 MG [address] [id number] [place], [place] - [address] AT [facility] [id number] [facility] [id number]: [phone] Fax: [phone] patient's contact info below:Contact Name: Jose A SweetPhone Number: [phone]

## 2025-03-18 RX ORDER — CARBAMAZEPINE 300 MG/1
900 CAPSULE, EXTENDED RELEASE ORAL 2 TIMES DAILY
Qty: 540 CAPSULE | Refills: 3 | Status: SHIPPED | OUTPATIENT
Start: 2025-03-18 | End: 2026-03-13

## 2025-03-19 RX ORDER — CARBAMAZEPINE 300 MG/1
900 CAPSULE, EXTENDED RELEASE ORAL 2 TIMES DAILY
Qty: 540 CAPSULE | Refills: 3 | Status: SHIPPED | OUTPATIENT
Start: 2025-03-19 | End: 2025-03-20 | Stop reason: SDUPTHER

## 2025-03-20 DIAGNOSIS — G40.909 SEIZURE DISORDER: ICD-10-CM

## 2025-03-20 NOTE — TELEPHONE ENCOUNTER
"----- Message from Tennille sent at 3/20/2025  2:52 PM CDT -----  Regarding: refill  Contact: [phone]  .Name Of Caller: Self Contact Preference?:   [phone] is the nature of the call?: Requesting refill for  carBAMazepine (CARBATROL) 300 MG [address] [id number] [place], [place] - [address] AT [facility] [id number] [facility] [id number]: [phone] Fax: [phone]  Additional Notes:"Thank you for all that you do for our patients"  "

## 2025-03-21 RX ORDER — CARBAMAZEPINE 300 MG/1
900 CAPSULE, EXTENDED RELEASE ORAL 2 TIMES DAILY
Qty: 540 CAPSULE | Refills: 3 | Status: SHIPPED | OUTPATIENT
Start: 2025-03-21 | End: 2026-03-16

## 2025-03-28 ENCOUNTER — HOSPITAL ENCOUNTER (OUTPATIENT)
Facility: OTHER | Age: 50
Discharge: HOME | End: 2025-03-29
Attending: EMERGENCY MEDICINE | Admitting: EMERGENCY MEDICINE
Payer: MEDICAID

## 2025-03-28 DIAGNOSIS — R07.9 CHEST PAIN: ICD-10-CM

## 2025-03-28 DIAGNOSIS — R42 LIGHT HEADEDNESS: ICD-10-CM

## 2025-03-28 DIAGNOSIS — R47.89 EPISODE OF CHANGE IN SPEECH: ICD-10-CM

## 2025-03-28 DIAGNOSIS — R42 DIZZINESS: Primary | ICD-10-CM

## 2025-03-28 LAB
ABSOLUTE EOSINOPHIL (OHS): 0.02 K/UL
ABSOLUTE MONOCYTE (OHS): 0.6 K/UL (ref 0.3–1)
ABSOLUTE NEUTROPHIL COUNT (OHS): 1.68 K/UL (ref 1.8–7.7)
ANION GAP (OHS): 11 MMOL/L (ref 8–16)
BASOPHILS # BLD AUTO: 0.04 K/UL
BASOPHILS NFR BLD AUTO: 1.1 %
BUN SERPL-MCNC: 13 MG/DL (ref 6–20)
CALCIUM SERPL-MCNC: 9.8 MG/DL (ref 8.7–10.5)
CHLORIDE SERPL-SCNC: 102 MMOL/L (ref 95–110)
CO2 SERPL-SCNC: 24 MMOL/L (ref 23–29)
CREAT SERPL-MCNC: 1.1 MG/DL (ref 0.5–1.4)
ERYTHROCYTE [DISTWIDTH] IN BLOOD BY AUTOMATED COUNT: 13.1 % (ref 11.5–14.5)
GFR SERPLBLD CREATININE-BSD FMLA CKD-EPI: >60 ML/MIN/1.73/M2
GLUCOSE SERPL-MCNC: 70 MG/DL (ref 70–110)
HCT VFR BLD AUTO: 46.2 % (ref 40–54)
HGB BLD-MCNC: 15.9 GM/DL (ref 14–18)
IMM GRANULOCYTES # BLD AUTO: 0.01 K/UL (ref 0–0.04)
IMM GRANULOCYTES NFR BLD AUTO: 0.3 % (ref 0–0.5)
LYMPHOCYTES # BLD AUTO: 1.4 K/UL (ref 1–4.8)
MCH RBC QN AUTO: 29.8 PG (ref 27–50)
MCHC RBC AUTO-ENTMCNC: 34.4 G/DL (ref 32–36)
MCV RBC AUTO: 87 FL (ref 82–98)
NUCLEATED RBC (/100WBC) (OHS): 0 /100 WBC
PLATELET # BLD AUTO: 227 K/UL (ref 150–450)
PMV BLD AUTO: 10.1 FL (ref 9.2–12.9)
POCT GLUCOSE: 153 MG/DL (ref 70–110)
POTASSIUM SERPL-SCNC: 3.7 MMOL/L (ref 3.5–5.1)
RBC # BLD AUTO: 5.34 M/UL (ref 4.6–6.2)
RELATIVE EOSINOPHIL (OHS): 0.5 %
RELATIVE LYMPHOCYTE (OHS): 37.3 % (ref 18–48)
RELATIVE MONOCYTE (OHS): 16 % (ref 4–15)
RELATIVE NEUTROPHIL (OHS): 44.8 % (ref 38–73)
SODIUM SERPL-SCNC: 137 MMOL/L (ref 136–145)
WBC # BLD AUTO: 3.75 K/UL (ref 3.9–12.7)

## 2025-03-28 PROCEDURE — 85025 COMPLETE CBC W/AUTO DIFF WBC: CPT | Performed by: EMERGENCY MEDICINE

## 2025-03-28 PROCEDURE — 80048 BASIC METABOLIC PNL TOTAL CA: CPT | Performed by: EMERGENCY MEDICINE

## 2025-03-28 PROCEDURE — G0378 HOSPITAL OBSERVATION PER HR: HCPCS

## 2025-03-28 PROCEDURE — 82962 GLUCOSE BLOOD TEST: CPT

## 2025-03-28 PROCEDURE — 93010 ELECTROCARDIOGRAM REPORT: CPT | Mod: ,,, | Performed by: INTERNAL MEDICINE

## 2025-03-28 PROCEDURE — 93005 ELECTROCARDIOGRAM TRACING: CPT

## 2025-03-28 PROCEDURE — 99285 EMERGENCY DEPT VISIT HI MDM: CPT | Mod: 25

## 2025-03-28 RX ORDER — OLMESARTAN MEDOXOMIL 40 MG/1
40 TABLET ORAL DAILY
COMMUNITY

## 2025-03-28 RX ORDER — TALC
6 POWDER (GRAM) TOPICAL NIGHTLY PRN
Status: DISCONTINUED | OUTPATIENT
Start: 2025-03-28 | End: 2025-03-29 | Stop reason: HOSPADM

## 2025-03-28 RX ORDER — CARBAMAZEPINE 100 MG/1
300 TABLET, CHEWABLE ORAL 2 TIMES DAILY
Status: DISCONTINUED | OUTPATIENT
Start: 2025-03-29 | End: 2025-03-29 | Stop reason: HOSPADM

## 2025-03-28 RX ORDER — ZONISAMIDE 100 MG/1
200 CAPSULE ORAL NIGHTLY
Status: DISCONTINUED | OUTPATIENT
Start: 2025-03-28 | End: 2025-03-29 | Stop reason: HOSPADM

## 2025-03-28 RX ORDER — ATORVASTATIN CALCIUM 20 MG/1
40 TABLET, FILM COATED ORAL DAILY
Status: DISCONTINUED | OUTPATIENT
Start: 2025-03-29 | End: 2025-03-29 | Stop reason: HOSPADM

## 2025-03-28 RX ORDER — VALSARTAN 80 MG/1
320 TABLET ORAL DAILY
Status: DISCONTINUED | OUTPATIENT
Start: 2025-03-29 | End: 2025-03-29 | Stop reason: HOSPADM

## 2025-03-28 RX ORDER — SODIUM CHLORIDE 0.9 % (FLUSH) 0.9 %
10 SYRINGE (ML) INJECTION
Status: DISCONTINUED | OUTPATIENT
Start: 2025-03-28 | End: 2025-03-29 | Stop reason: HOSPADM

## 2025-03-28 NOTE — ED TRIAGE NOTES
Pt presents to the ED with c/o dizziness and slurred speech x4 days. Pt denies any recent falls. Pt AAOx4, NAD noted

## 2025-03-28 NOTE — Clinical Note
Diagnosis: Dizziness [542275]   Future Attending Provider: NATO ESCOBAR [[id number]]   Is the patient being sent to ED Observation?: Yes

## 2025-03-28 NOTE — H&P
[facility] ED Observation Unit  History and Physical      I assumed care of this patient from the Emergency Department at onset of observation time, 5:27 PM  on 03/28/2025.       History of Present Illness:  Jose A Sweet is a 49 y.o. male with hx seizure d/o on carbamezapine, zonisamide, htn, hyperlipidemia presenting with episode of severe dizziness marked by lightheadedness and inability to walk independently lasting 1-2 hours four days ago and accompanied by dysarthria in speech for the same period of time.  Speech difficulty has resolved.  After that he has been able to walk without assistance but feels slight lightheadedness and off-balance.  He denies any vertigo.  He denies ear issues such as pressure, tinnitus, pain.  He denies change in vision, difficulty swallowing.  There was no associated focal numbness or weakness.  Did have a mild right-sided headache at the same period of time also resolved.  He denies head trauma.  He denies new medications apart from adjustment of carbamazepine back to standard dose of three capsules twice daily from a reduced dose of one capsule.      ED Course:  He is outside any window for thrombolytic therapy and endovascular therapy with symptoms markedly improved.   Labs were unremarkable.  I did discuss potential next steps with consideration of CVA versus TIA inpatient with shared decision-making would wish to come in for further expedited workup due to suspicion for at least TIA. Initial head CT without acute process. Patient to be admitted for further workup and neurology consultation.     I reviewed the ED Provider Note dated 03/28/2025  prior to my evaluation of this patient.  I reviewed all labs and imaging performed in the Main ED, prior to patient being placed in Observation. Patient was placed in the ED Observation Unit for dizziness.    PMHx   Past Medical History:   Diagnosis Date    Hypertension     Seizures       No past surgical history on file.     Family  Hx   No family history on file.     Social Hx   Social History     Socioeconomic History    Marital status: Single   Tobacco Use    Smoking status: Never    Smokeless tobacco: Never   Substance and Sexual Activity    Alcohol use: No    Drug use: Never    Sexual activity: Yes     Partners: Female     Social Drivers of Health     Financial Resource Strain: At Risk (2024)    Received from [facility]    Financial Resource Strain     Financial Resource Strain: 2   Food Insecurity: At Risk (2024)    Received from [facility]    Food Insecurity     Food: 2   Transportation Needs: Declined (2024)    Received from [facility]    Transportation Needs     Transportation: 99   Physical Activity: Not on File (2024)    Received from [facility]    Physical Activity     Physical Activity: 0   Stress: Not at Risk (2024)    Received from [facility]    Stress     Stress: 1   Housing Stability: Declined (2024)    Received from [facility]    Housing Stability     Housin        Vital Signs   Vitals:    25 1606 25 1651 25 1652 25 1654   BP:       Pulse: 81 72 69 75   Resp: 14 12 13    Temp:       TempSrc:       SpO2: 99% 99% 100% 96%   Weight:       Height:        25   BP: 134/80   Pulse: 67   Resp: 18   Temp: 98.2 °F (36.8 °C)   TempSrc: Oral   SpO2: 99%   Weight:    Height:        Review of Systems  Review of Systems   Constitutional:  Negative for chills and fever.   Eyes:  Negative for blurred vision and double vision.   Respiratory:  Negative for cough and shortness of breath.    Cardiovascular:  Negative for chest pain.   Gastrointestinal:  Negative for abdominal pain, nausea and vomiting.   Neurological:  Positive for dizziness. Negative for weakness and headaches.   All other systems reviewed and are negative.      Brief Physical Exam/Reassessment   Physical Exam    Nursing note and vitals reviewed.  Constitutional: Vital signs are normal. He appears well-developed and well-nourished. He does not  appear ill. No distress.   HENT:   Head: Normocephalic and atraumatic. Mouth/Throat: Mucous membranes are normal. Mucous membranes are not pale and not dry.   Eyes: Conjunctivae and EOM are normal. Pupils are equal, round, and reactive to light. Right eye exhibits no nystagmus. Left eye exhibits no nystagmus.   Neck: Neck supple.   Cardiovascular:  Normal rate, regular rhythm, S1 normal, S2 normal and normal heart sounds.           Pulmonary/Chest: Effort normal and breath sounds normal. No tachypnea. He has no decreased breath sounds. He has no wheezes.   Musculoskeletal:      Cervical back: Neck supple.     Neurological: He is alert and oriented to person, place, and time. GCS eye subscore is 4. GCS verbal subscore is 5. GCS motor subscore is 6.   Cranial nerves 2, 3, 4, 5, 6, 7, 8, 11 and 12 intact.  No facial, tongue, or eye movement/pupillary response impairment was appreciated.  Ambulatory steady gait.   Skin: Skin is warm, dry and intact.         Abnormal Labs Reviewed   CBC WITH DIFFERENTIAL - Abnormal; Notable for the following components:       Result Value    WBC 3.75 (*)     Mono % 16.0 (*)     Neut # 1.68 (*)     All other components within normal limits   POCT GLUCOSE - Abnormal; Notable for the following components:    POCT Glucose 153 (*)     All other components within normal limits       MRI Brain Without Contrast   Final Result      No acute intracranial process.  Specifically, no evidence of acute infarction.         Electronically signed by: Nathanael Dupree MD   Date:    03/28/2025   Time:    18:53      CT Head Without Contrast   Final Result      No acute intracranial process.  Consideration for MRI of the brain, as clinically warranted.         Electronically signed by: Nathanael Dupree MD   Date:    03/28/2025   Time:    16:41            Medications:   Scheduled Meds:  Continuous Infusions:  PRN Meds:.  Current Facility-Administered Medications:     melatonin, 6 mg, Oral, Nightly PRN    sodium  chloride 0.9%, 10 mL, Intravenous, PRN      Assessment/Plan:    1. Dizziness    - neuro consult, MRI, carotid ultrasound   2. Episode of change in speech        Case was discussed with the ED provider, Jani Jimenez MD

## 2025-03-28 NOTE — ED PROVIDER NOTES
Chief complaint:  Dizziness (Reports dizziness and nausea since Tuesday. States Tuesday he had felt like his speech was slurred but has since resolved. Denies unilateral weakness/numbness/tingling. No deficits noted. )      HPI:  Jose A Sweet is a 49 y.o. male with hx seizure d/o on carbamezapine, zonisamide, htn, hyperlipidemia presenting with episode of severe dizziness marked by lightheadedness and inability to walk independently lasting 1-2 hours four days ago and accompanied by dysarthria in speech for the same period of time.  Speech difficulty has resolved.  After that he has been able to walk without assistance but feels slight lightheadedness and off-balance.  He denies any vertigo.  He denies ear issues such as pressure, tinnitus, pain.  He denies change in vision, difficulty swallowing.  There was no associated focal numbness or weakness.  Did have a mild right-sided headache at the same period of time also resolved.  He denies head trauma.  He denies new medications apart from adjustment of carbamazepine back to standard dose of three capsules twice daily from a reduced dose of one capsule.    ROS: As per HPI and below:  No chest pain, diaphoresis, emesis, diarrhea, blood in the stools, dark stools, abdominal pain, swelling, change in urination, fever, ongoing headache, syncope or presyncope, confusion.    Review of patient's allergies indicates:  No Known Allergies    Current Discharge Medication List        CONTINUE these medications which have NOT CHANGED    Details   albuterol (PROVENTIL/VENTOLIN HFA) 90 mcg/actuation inhaler Inhale 2 puffs into the lungs every 4 (four) hours as needed for Wheezing or Shortness of Breath (Please dispense with spacer).  Qty: 18 g, Refills: 0      azithromycin (Z-MICH) 250 MG tablet Take 1 tablet (250 mg total) by mouth once daily. Take first 2 tablets together, then 1 every day until finished.  Qty: 6 tablet, Refills: 0      !! carBAMazepine (CARBATROL) 300  MG CM12 Take 3 capsules (900 mg total) by mouth 2 (two) times daily.  Qty: 540 capsule, Refills: 3    Associated Diagnoses: Seizure disorder      !! carBAMazepine (CARBATROL) 300 MG CM12 Take 3 capsules (900 mg total) by mouth 2 (two) times daily.  Qty: 540 capsule, Refills: 3    Comments: Please provide refill  Associated Diagnoses: Seizure disorder      fluticasone (FLONASE) 50 mcg/actuation nasal spray 1 spray (50 mcg total) by Each Nare route 2 (two) times daily as needed for Rhinitis.  Qty: 15 g, Refills: 0      rosuvastatin (CRESTOR) 10 MG tablet Take 10 mg by mouth once daily.      tamsulosin (FLOMAX) 0.4 mg Cap Take 1 capsule (0.4 mg total) by mouth once daily.  Qty: 30 capsule, Refills: 0      zonisamide (ZONEGRAN) 100 MG Cap Take 2 capsules (200 mg total) by mouth nightly.  Qty: 180 capsule, Refills: 3    Associated Diagnoses: Seizure disorder       !! - Potential duplicate medications found. Please discuss with provider.          PMH:  As per HPI and below:  Past Medical History:   Diagnosis Date    Hypertension     Seizures      No past surgical history on file.    Social History     Socioeconomic History    Marital status: Single   Tobacco Use    Smoking status: Never    Smokeless tobacco: Never   Substance and Sexual Activity    Alcohol use: No    Drug use: Never    Sexual activity: Yes     Partners: Female     Social Drivers of Health     Financial Resource Strain: At Risk (2/2/2024)    Received from [facility] [employer]    Financial Resource Strain     Financial Resource Strain: 2   Food Insecurity: At Risk (2/2/2024)    Received from [facility]    Food Insecurity     Food: 2   Transportation Needs: Declined (2/2/2024)    Received from [facility]    Transportation Needs     Transportation: 99   Physical Activity: Not on File (1/29/2024)    Received from [facility]    Physical Activity     Physical Activity: 0   Stress: Not at Risk (2/2/2024)    Received from [facility]    Stress     Stress: 1   Housing Stability: Declined (2/2/2024)     Received from [facility]    [facility]     Housin       No family history on file.    Physical Exam:    Vitals:    25   BP: 134/80   Pulse: 67   Resp: 18   Temp: 98.2 °F (36.8 °C)     GENERAL:  No apparent distress.  Alert.    HEENT:  Moist mucous membranes.  Normocephalic and atraumatic.  Extraocular movements intact without nystagmus.  NECK:  No swelling.  Midline trachea.   CARDIOVASCULAR:  Regular rate and rhythm.  2+ radial pulses.  No murmur.  PULMONARY:  Lungs clear to auscultation bilaterally.  No wheezes, rales, or rhonci.    ABDOMEN:  Non-tender and non-distended.    EXTREMITIES:  Warm and well perfused.  Brisk capillary refill.  No edema.  NEUROLOGICAL:  Normal mental status.  Appropriate and conversant.  Normal gait without ataxia.  5/5 strength and sensation.  CN III through XII intact.    SKIN:  No rashes or ecchymoses.      Labs Reviewed   CBC WITH DIFFERENTIAL - Abnormal       Result Value    WBC 3.75 (*)     RBC 5.34      HGB 15.9      HCT 46.2      MCV 87      MCH 29.8      MCHC 34.4      RDW 13.1      Platelet Count 227      MPV 10.1      Nucleated RBC 0      Neut % 44.8      Lymph % 37.3      Mono % 16.0 (*)     Eos % 0.5      Basophil % 1.1      Imm Grans % 0.3      Neut # 1.68 (*)     Lymph # 1.40      Mono # 0.60      Eos # 0.02      Baso # 0.04      Imm Grans # 0.01     POCT GLUCOSE - Abnormal    POCT Glucose 153 (*)    BASIC METABOLIC PANEL - Normal    Sodium 137      Potassium 3.7      Chloride 102      CO2 24      Glucose 70      BUN 13      Creatinine 1.1      Calcium 9.8      Anion Gap 11      eGFR >60     CBC W/ AUTO DIFFERENTIAL    Narrative:     The following orders were created for panel order CBC auto differential.  Procedure                               Abnormality         Status                     ---------                               -----------         ------                     CBC with Differential[[id number]]       Abnormal            Final result                  Please view results for these tests on the individual orders.       Current Discharge Medication List        CONTINUE these medications which have NOT CHANGED    Details   albuterol (PROVENTIL/VENTOLIN HFA) 90 mcg/actuation inhaler Inhale 2 puffs into the lungs every 4 (four) hours as needed for Wheezing or Shortness of Breath (Please dispense with spacer).  Qty: 18 g, Refills: 0      azithromycin (Z-MICH) 250 MG tablet Take 1 tablet (250 mg total) by mouth once daily. Take first 2 tablets together, then 1 every day until finished.  Qty: 6 tablet, Refills: 0      !! carBAMazepine (CARBATROL) 300 MG CM12 Take 3 capsules (900 mg total) by mouth 2 (two) times daily.  Qty: 540 capsule, Refills: 3    Associated Diagnoses: Seizure disorder      !! carBAMazepine (CARBATROL) 300 MG CM12 Take 3 capsules (900 mg total) by mouth 2 (two) times daily.  Qty: 540 capsule, Refills: 3    Comments: Please provide refill  Associated Diagnoses: Seizure disorder      fluticasone (FLONASE) 50 mcg/actuation nasal spray 1 spray (50 mcg total) by Each Nare route 2 (two) times daily as needed for Rhinitis.  Qty: 15 g, Refills: 0      rosuvastatin (CRESTOR) 10 MG tablet Take 10 mg by mouth once daily.      tamsulosin (FLOMAX) 0.4 mg Cap Take 1 capsule (0.4 mg total) by mouth once daily.  Qty: 30 capsule, Refills: 0      zonisamide (ZONEGRAN) 100 MG Cap Take 2 capsules (200 mg total) by mouth nightly.  Qty: 180 capsule, Refills: 3    Associated Diagnoses: Seizure disorder       !! - Potential duplicate medications found. Please discuss with provider.          Orders Placed This Encounter   Procedures    CT Head Without Contrast    MRI Brain Without Contrast    CBC auto differential    Basic metabolic panel    CBC with Differential    Diet Heart Healthy    Confirm Patient is not Eligible for Congestive Heart Failure Pathway    Vital signs    Intake and output    Notify physician     Place BARRY hose    Place sequential compression  device    Full code    Inpatient Consult to Neurology Services (General Neurology)    Pulse Oximetry Q4H    EKG 12-lead    Insert Saline lock IV    Place in Observation       Imaging Results              MRI Brain Without Contrast (Final result)  Result time 03/28/25 18:53:23      Final result by Nathanael Dupree MD (03/28/25 18:53:23)                   Impression:      No acute intracranial process.  Specifically, no evidence of acute infarction.      Electronically signed by: Nathaneal Dupree MD  Date:    03/28/2025  Time:    18:53               Narrative:    EXAMINATION:  MRI BRAIN WITHOUT CONTRAST    CLINICAL HISTORY:  Stroke, follow up;    TECHNIQUE:  Multiplanar multisequence MR imaging of the brain was performed without contrast.    COMPARISON:  CT dated 03/28/2025.    FINDINGS:  There is a mild motion limitations to the exam.    The craniocervical junction is intact.  The sellar and parasellar structures are unremarkable.  The intracranial flow voids are within normal limits.    No acute diffusion-weighted signal abnormality is identified.  The ventricles and sulci are within normal limits.  There is no focal parenchymal signal abnormality.  There are no extra-axial fluid collections.  There is no evidence of intracranial hemorrhage.  There is no evidence of mass effect.    The orbits and intraorbital contents are within normal limits.  There is trace mucosal thickening within the ethmoid sinuses.  The mastoid air cells are clear.                                       CT Head Without Contrast (Final result)  Result time 03/28/25 16:41:24      Final result by Nathanael Dupree MD (03/28/25 16:41:24)                   Impression:      No acute intracranial process.  Consideration for MRI of the brain, as clinically warranted.      Electronically signed by: Nathanael Dupree MD  Date:    03/28/2025  Time:    16:41               Narrative:    EXAMINATION:  CT HEAD WITHOUT CONTRAST    CLINICAL HISTORY:  Neuro deficit, acute,  stroke suspected;Dizziness, persistent/recurrent, cardiac or vascular cause suspected;    TECHNIQUE:  Low dose axial images were obtained through the head.  Coronal and sagittal reformations were also performed. Contrast was not administered.    COMPARISON:  None.    FINDINGS:  The subcutaneous tissues are unremarkable.  The bony calvarium is intact.  The paranasal sinuses are unremarkable.  The mastoid air cells are clear.  There is a chronic fracture of the right medial orbital wall.  The orbits and intraorbital contents are within normal limits.    The craniocervical junction is intact.  The sellar and parasellar structures are unremarkable.  There are no extra-axial fluid collections.  There is no evidence of intracranial hemorrhage.    The ventricles and sulci are within normal limits.  The cisterns are unremarkable.  The gray-white differentiation is maintained.  There is no dense vessel sign.  There is no evidence of mass effect.                                  (radiology reading, visualized by me)    ED Course as of 03/28/25 2146   Fri Mar 28, 2025   1603 EKG:  Normal sinus rhythm at a rate of 82.  Normal intervals.  Normal axis.  LVH is present.  Old T-wave abnormalities similar to last prior.  No significant ST or T wave changes suggesting acute ischemia or infarction.  (Independently interpreted by me)   [MR]      ED Course User Index  [MR] Jani Peres MD       MDM:    49 y.o. male with episode of dizziness and ataxia by history along with dysarthria four days ago suggestive of TIA versus CVA.  He is outside any window for thrombolytic therapy and endovascular therapy with symptoms markedly improved, although he does note some subjective balance issue despite no objective deficit here.  Low suspicion for cardiac etiologies such as ischemia or malignant arrhythmia with EKG ordered.  Basic labs also ordered with low suspicion for other end-organ dysfunction such as anemia or electrolyte  derangement.  CT head ordered for initial brain imaging.  I did discuss potential next steps with consideration of CVA versus TIA inpatient with shared decision-making would wish to come in for further expedited workup due to suspicion for at least TIA.  Initial head CT without acute process.  Patient to be admitted for further workup and neurology consultation.    Diagnoses:    1. Dizziness  2. Episode of dysarthria       Jani Peres MD  03/28/25 5460

## 2025-03-29 VITALS
HEART RATE: 66 BPM | OXYGEN SATURATION: 98 % | SYSTOLIC BLOOD PRESSURE: 133 MMHG | WEIGHT: 130 LBS | RESPIRATION RATE: 16 BRPM | TEMPERATURE: 98 F | DIASTOLIC BLOOD PRESSURE: 86 MMHG | HEIGHT: 73 IN | BODY MASS INDEX: 17.23 KG/M2

## 2025-03-29 PROBLEM — R42 DIZZINESS: Status: ACTIVE | Noted: 2025-03-29

## 2025-03-29 LAB
ABSOLUTE EOSINOPHIL (OHS): 0.01 K/UL
ABSOLUTE MONOCYTE (OHS): 0.39 K/UL (ref 0.3–1)
ABSOLUTE NEUTROPHIL COUNT (OHS): 1.44 K/UL (ref 1.8–7.7)
ALBUMIN SERPL BCP-MCNC: 3.9 G/DL (ref 3.5–5.2)
ALP SERPL-CCNC: 63 UNIT/L (ref 40–150)
ALT SERPL W/O P-5'-P-CCNC: 22 UNIT/L (ref 10–44)
ANION GAP (OHS): 9 MMOL/L (ref 8–16)
AST SERPL-CCNC: 19 UNIT/L (ref 11–45)
BASOPHILS # BLD AUTO: 0.03 K/UL
BASOPHILS NFR BLD AUTO: 0.9 %
BILIRUB SERPL-MCNC: 0.3 MG/DL (ref 0.1–1)
BNP SERPL-MCNC: <10 PG/ML (ref 0–99)
BUN SERPL-MCNC: 13 MG/DL (ref 6–20)
CALCIUM SERPL-MCNC: 9 MG/DL (ref 8.7–10.5)
CHLORIDE SERPL-SCNC: 98 MMOL/L (ref 95–110)
CO2 SERPL-SCNC: 27 MMOL/L (ref 23–29)
CREAT SERPL-MCNC: 1.1 MG/DL (ref 0.5–1.4)
ERYTHROCYTE [DISTWIDTH] IN BLOOD BY AUTOMATED COUNT: 12.8 % (ref 11.5–14.5)
GFR SERPLBLD CREATININE-BSD FMLA CKD-EPI: >60 ML/MIN/1.73/M2
GLUCOSE SERPL-MCNC: 107 MG/DL (ref 70–110)
HCT VFR BLD AUTO: 45.5 % (ref 40–54)
HGB BLD-MCNC: 15.3 GM/DL (ref 14–18)
IMM GRANULOCYTES # BLD AUTO: 0.01 K/UL (ref 0–0.04)
IMM GRANULOCYTES NFR BLD AUTO: 0.3 % (ref 0–0.5)
LYMPHOCYTES # BLD AUTO: 1.44 K/UL (ref 1–4.8)
MCH RBC QN AUTO: 29.4 PG (ref 27–50)
MCHC RBC AUTO-ENTMCNC: 33.6 G/DL (ref 32–36)
MCV RBC AUTO: 88 FL (ref 82–98)
NUCLEATED RBC (/100WBC) (OHS): 0 /100 WBC
OHS QRS DURATION: 88 MS
OHS QTC CALCULATION: 397 MS
PLATELET # BLD AUTO: 199 K/UL (ref 150–450)
PMV BLD AUTO: 10.1 FL (ref 9.2–12.9)
POTASSIUM SERPL-SCNC: 4.1 MMOL/L (ref 3.5–5.1)
PROT SERPL-MCNC: 7.8 GM/DL (ref 6–8.4)
RBC # BLD AUTO: 5.2 M/UL (ref 4.6–6.2)
RELATIVE EOSINOPHIL (OHS): 0.3 %
RELATIVE LYMPHOCYTE (OHS): 43.4 % (ref 18–48)
RELATIVE MONOCYTE (OHS): 11.7 % (ref 4–15)
RELATIVE NEUTROPHIL (OHS): 43.4 % (ref 38–73)
SODIUM SERPL-SCNC: 134 MMOL/L (ref 136–145)
TROPONIN I SERPL DL<=0.01 NG/ML-MCNC: <0.006 NG/ML
WBC # BLD AUTO: 3.32 K/UL (ref 3.9–12.7)

## 2025-03-29 PROCEDURE — 83880 ASSAY OF NATRIURETIC PEPTIDE: CPT

## 2025-03-29 PROCEDURE — 80053 COMPREHEN METABOLIC PANEL: CPT

## 2025-03-29 PROCEDURE — 25000003 PHARM REV CODE 250

## 2025-03-29 PROCEDURE — 36415 COLL VENOUS BLD VENIPUNCTURE: CPT

## 2025-03-29 PROCEDURE — 84484 ASSAY OF TROPONIN QUANT: CPT

## 2025-03-29 PROCEDURE — 93010 ELECTROCARDIOGRAM REPORT: CPT | Mod: ,,, | Performed by: INTERNAL MEDICINE

## 2025-03-29 PROCEDURE — G0378 HOSPITAL OBSERVATION PER HR: HCPCS

## 2025-03-29 PROCEDURE — 25000003 PHARM REV CODE 250: Performed by: NURSE PRACTITIONER

## 2025-03-29 PROCEDURE — 25500020 PHARM REV CODE 255

## 2025-03-29 PROCEDURE — 85025 COMPLETE CBC W/AUTO DIFF WBC: CPT

## 2025-03-29 PROCEDURE — 93005 ELECTROCARDIOGRAM TRACING: CPT

## 2025-03-29 RX ORDER — NAPROXEN SODIUM 220 MG/1
81 TABLET, FILM COATED ORAL DAILY
Qty: 30 TABLET | Refills: 0 | Status: SHIPPED | OUTPATIENT
Start: 2025-03-29 | End: 2025-04-28

## 2025-03-29 RX ORDER — NAPROXEN SODIUM 220 MG/1
81 TABLET, FILM COATED ORAL
Status: COMPLETED | OUTPATIENT
Start: 2025-03-29 | End: 2025-03-29

## 2025-03-29 RX ADMIN — ZONISAMIDE 200 MG: 100 CAPSULE ORAL at 12:03

## 2025-03-29 RX ADMIN — ATORVASTATIN CALCIUM 40 MG: 20 TABLET, FILM COATED ORAL at 08:03

## 2025-03-29 RX ADMIN — CARBAMAZEPINE 300 MG: 100 TABLET, CHEWABLE ORAL at 12:03

## 2025-03-29 RX ADMIN — VALSARTAN 320 MG: 80 TABLET, FILM COATED ORAL at 08:03

## 2025-03-29 RX ADMIN — ASPIRIN 81 MG CHEWABLE TABLET 81 MG: 81 TABLET CHEWABLE at 04:03

## 2025-03-29 RX ADMIN — IOHEXOL 75 ML: 350 INJECTION, SOLUTION INTRAVENOUS at 03:03

## 2025-03-29 RX ADMIN — CARBAMAZEPINE 300 MG: 100 TABLET, CHEWABLE ORAL at 08:03
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
## 2025-03-29 NOTE — ED NOTES
03/29/25 0057   Safety Interventions   Updates Patient is resting comfortably;Bed rails up - Call light within reach;Vitals stable;Denies pain   Patient Rounds bed in low position;bed wheels locked;call light in patient/parent reach;clutter free environment maintained;ID band on;placement of personal items at bedside;visualized patient

## 2025-03-29 NOTE — PROGRESS NOTES
"Pt was evaluated by neurology provider Dr. Jones who recommends CTA head and neck.     Per nurse, pt reported left-sided chest pain with inspiration after completing CTA head and neck. Describes the pain as "soreness" like when someone "rubs a knuckle into your chest." He denies SOB, palpitations. Obtaining EKG, CXR and labs.     CTA head and neck results with no acute intracranial abnormality that requires intervention at this time. Dr. Jones recommends Aspirin 81 mg daily.   "

## 2025-03-29 NOTE — SUBJECTIVE & OBJECTIVE
HPI:  50 yo male with medical history of seizures on carbamezapine, zonisamide / HTN / HLD  - with admission concerns of dizziness. And neurology has been consulted for the same.     History from patient / medical records review.     Event of transient event of slurred speech / lasting for few mins. However post that - have experienced dizziness / non vertigo pattern / unsteadiness in walking wo presyncope - with postural correlation of change from supine to standing and ambulation. No prodromal symptoms of pain/ diaphoresis / felling pale / generalized weakness. No syncope. No other clear emotional / situational trigger. No associated cardiogenic triggers. No focal motor or sensory or cranial nerve deficits. No witnessed seizure event. No history of primary autonomic failure / cardiac arrhyhtmia pathology. No recent changes in medications - apart from adjustment of carbamazepine back to standard dose of three capsules twice daily from a reduced dose of one capsule. No history of stroke / seizures / complex migraines.     MRI brain unrevealing for focal acute etiology     Images personally reviewed and interpreted:  Study: MRI Brain  Study Interpretation: No acute findings     Additional studies reviewed:   Study: Cardiac_Neuro: EEG  Study Interpretation: N/A    Laboratory studies reviewed:  BMP:   Lab Results   Component Value Date    GLUCOSE 70 03/28/2025    GLUCOSE 111 (H) 12/12/2024     03/28/2025     12/12/2024     06/04/2024    K 3.7 03/28/2025    K 3.9 12/12/2024    K 4.3 06/04/2024     03/28/2025     06/04/2024    CO2 24 03/28/2025    CO2 32 12/12/2024    CO2 27 06/04/2024    BUN 13 03/28/2025    CREATININE 1.1 03/28/2025    CALCIUM 9.8 03/28/2025    CALCIUM 9.3 12/12/2024    CALCIUM 9.7 06/04/2024     CBC:   Lab Results   Component Value Date    WBC 3.75 (L) 03/28/2025    RBC 5.34 03/28/2025    RBC 4.90 06/04/2024    HGB 15.9 03/28/2025    HGB 14.5 06/04/2024    HCT 46.2  "03/28/2025    HCT 43.1 06/04/2024     03/28/2025     06/04/2024    MCV 87 03/28/2025    MCV 88 06/04/2024    MCH 29.8 03/28/2025    MCHC 34.4 03/28/2025    MCHC 33.6 06/04/2024     Lipid Panel:   Lab Results   Component Value Date    CHOL 221 (H) 10/28/2024    LDLCALC 144 (H) 10/28/2024    HDL 63 (H) 10/28/2024    TRIG 70 10/28/2024     Coagulation: No results found for: "PT", "INR", "APTT"  Hgb A1C:   Lab Results   Component Value Date    HGBA1C 5.6 10/28/2024     TSH: No results found for: "TSH"    Documentation personally reviewed:  Notes: Notes: H&P     Assessment and plan:  50 yo male with medical history of seizures on carbamezapine, zonisamide / HTN / HLD  - with admission concerns of dizziness. And neurology has been consulted for the same.     History from patient / medical records review.     Event of transient event of slurred speech / lasting for few mins. However post that - have experienced dizziness / non vertigo pattern / unsteadiness in walking wo presyncope - with postural correlation of change from supine to standing and ambulation. No prodromal symptoms of pain/ diaphoresis / felling pale / generalized weakness. No syncope. No other clear emotional / situational trigger. No associated cardiogenic triggers. No focal motor or sensory or cranial nerve deficits. No witnessed seizure event. No history of primary autonomic failure / cardiac arrhyhtmia pathology. No recent changes in medications - apart from adjustment of carbamazepine back to standard dose of three capsules twice daily from a reduced dose of one capsule. No history of stroke / seizures / complex migraines.     MRI brain unrevealing for focal acute etiology     Recs:   Dizziness - suspect in the spectrum of orthostatic and iatrogenic confounders. Low suspicion for focal cerebrovascular ischemic event or epileptic or neuro inflammatory etiology - however needs r/o     CTA head and neck to r/o significant intracranial " stenosis  Consider ASA 81 monotherapy     Avoid hypotension / maintain perfusion - hydration / long term goals < 140/90   Counseled on the triggers / trigger avoidance     Check ortho stats / PT-OT assistance on the same / correlate with any triggers related to home medications and modify accordingly if positive / modify daily activities and lifestyle as needed / prn use compression stockings and abdominal binders / maximize on non pharmacological measures     ENT referral on op basis for evaluation for atypical peripheral vestibular etiological triggers     -- Correct lytes as needed / control infection if any / avoid hypoxia or hypercapnia / avoid hypoglycemia   -- Correct any nutritional deficiencies / correct anemia / provide nutritional support as appropriate / ambulate when appropriate - PT/OT recs     Post charge discharge plan:  Clinic follow up: General Neurology    Visit Type: in-person only  Timeframe: 4 weeks

## 2025-03-29 NOTE — ED NOTES
03/29/25 0611   Safety Interventions   Quick Updates - Free Text Pt easily aroused. Denies any needs or concerns at this time. NADN   Updates Patient is resting comfortably;Bed rails up - Call light within reach;Vitals stable;Denies pain   Patient Rounds bed in low position;bed wheels locked;call light in patient/parent reach;clutter free environment maintained;ID band on;placement of personal items at bedside;visualized patient

## 2025-03-29 NOTE — DISCHARGE INSTRUCTIONS
Take aspirin 81 mg once daily.  You can continue taking your other daily prescribed medications.  Follow-up with Neurology in clinic.  Referral has been provided.  Follow-up with Cardiology in clinic for chest pain.  Referral has been provided.  Make sure you are staying hydrated and eating a healthy diet.

## 2025-03-29 NOTE — ED NOTES
"Once returning from CT, patient made a comment to the tech in his room that he felt as if "someone punched me in the chest". Upon further questioning, patient described his sudden chest pain as "aching" . Nurse and physician notified.  "

## 2025-03-29 NOTE — ED NOTES
Pt escorted from CT scan back to room by ED tech.    Spoke with pt   Says there is a slight improvement with redness but he is still very swollen and turns pink to white when pressure is applied   No fever overall feeling okay keeping foot elevated   Rx to [facility] [address]

## 2025-03-29 NOTE — TELEMEDICINE CONSULT
"[facility]   General Neurology  Consult Note      Consult Information  Inpatient Consult to Neurology Services (General Neurology)  Consult performed by: Balaji Jones MD  Consult ordered by: Nasreen Ortiz FNP          Consulting Provider:    Current Providers  No providers found    Patient Location:  [facility]ERG[facility]* IP Unit    Spoke hospital nurse at bedside with patient assisting consultant.  Patient information was obtained from patient.       Neurology Documentation  Acute Stroke Times   Stroke Team Called Date: 03/29/25  Stroke Team Called Time: 1000  Stroke Team Arrival Date: 03/29/25  Stroke Team Arrival Time: 1420    Blood pressure 120/72, pulse 60, temperature 98 °F (36.7 °C), temperature source Oral, resp. rate 12, height 6' 1" (1.854 m), weight 59 kg (130 lb), SpO2 96%.    Medical Decision Making  HPI:  50 yo male with medical history of seizures on carbamezapine, zonisamide / HTN / HLD  - with admission concerns of dizziness. And neurology has been consulted for the same.     History from patient / medical records review.     Event of transient event of slurred speech / lasting for few mins. However post that - have experienced dizziness / non vertigo pattern / unsteadiness in walking wo presyncope - with postural correlation of change from supine to standing and ambulation. No prodromal symptoms of pain/ diaphoresis / felling pale / generalized weakness. No syncope. No other clear emotional / situational trigger. No associated cardiogenic triggers. No focal motor or sensory or cranial nerve deficits. No witnessed seizure event. No history of primary autonomic failure / cardiac arrhyhtmia pathology. No recent changes in medications - apart from adjustment of carbamazepine back to standard dose of three capsules twice daily from a reduced dose of one capsule. No history of stroke / seizures / complex migraines.     MRI brain unrevealing for focal acute etiology     Images " "personally reviewed and interpreted:  Study: MRI Brain  Study Interpretation: No acute findings     Additional studies reviewed:   Study: Cardiac_Neuro: EEG  Study Interpretation: N/A    Laboratory studies reviewed:  BMP:   Lab Results   Component Value Date    GLUCOSE 70 03/28/2025    GLUCOSE 111 (H) 12/12/2024     03/28/2025     12/12/2024     06/04/2024    K 3.7 03/28/2025    K 3.9 12/12/2024    K 4.3 06/04/2024     03/28/2025     06/04/2024    CO2 24 03/28/2025    CO2 32 12/12/2024    CO2 27 06/04/2024    BUN 13 03/28/2025    CREATININE 1.1 03/28/2025    CALCIUM 9.8 03/28/2025    CALCIUM 9.3 12/12/2024    CALCIUM 9.7 06/04/2024     CBC:   Lab Results   Component Value Date    WBC 3.75 (L) 03/28/2025    RBC 5.34 03/28/2025    RBC 4.90 06/04/2024    HGB 15.9 03/28/2025    HGB 14.5 06/04/2024    HCT 46.2 03/28/2025    HCT 43.1 06/04/2024     03/28/2025     06/04/2024    MCV 87 03/28/2025    MCV 88 06/04/2024    MCH 29.8 03/28/2025    MCHC 34.4 03/28/2025    MCHC 33.6 06/04/2024     Lipid Panel:   Lab Results   Component Value Date    CHOL 221 (H) 10/28/2024    LDLCALC 144 (H) 10/28/2024    HDL 63 (H) 10/28/2024    TRIG 70 10/28/2024     Coagulation: No results found for: "PT", "INR", "APTT"  Hgb A1C:   Lab Results   Component Value Date    HGBA1C 5.6 10/28/2024     TSH: No results found for: "TSH"    Documentation personally reviewed:  Notes: Notes: H&P     Assessment and plan:  48 yo male with medical history of seizures on carbamezapine, zonisamide / HTN / HLD  - with admission concerns of dizziness. And neurology has been consulted for the same.     History from patient / medical records review.     Event of transient event of slurred speech / lasting for few mins. However post that - have experienced dizziness / non vertigo pattern / unsteadiness in walking wo presyncope - with postural correlation of change from supine to standing and ambulation. No prodromal " symptoms of pain/ diaphoresis / felling pale / generalized weakness. No syncope. No other clear emotional / situational trigger. No associated cardiogenic triggers. No focal motor or sensory or cranial nerve deficits. No witnessed seizure event. No history of primary autonomic failure / cardiac arrhyhtmia pathology. No recent changes in medications - apart from adjustment of carbamazepine back to standard dose of three capsules twice daily from a reduced dose of one capsule. No history of stroke / seizures / complex migraines.     MRI brain unrevealing for focal acute etiology     Recs:   Dizziness - suspect in the spectrum of orthostatic and iatrogenic confounders. Low suspicion for focal cerebrovascular ischemic event or epileptic or neuro inflammatory etiology - however needs r/o     CTA head and neck to r/o significant intracranial stenosis  Consider ASA 81 monotherapy     Avoid hypotension / maintain perfusion - hydration / long term goals < 140/90   Counseled on the triggers / trigger avoidance     Check ortho stats / PT-OT assistance on the same / correlate with any triggers related to home medications and modify accordingly if positive / modify daily activities and lifestyle as needed / prn use compression stockings and abdominal binders / maximize on non pharmacological measures     ENT referral on op basis for evaluation for atypical peripheral vestibular etiological triggers     -- Correct lytes as needed / control infection if any / avoid hypoxia or hypercapnia / avoid hypoglycemia   -- Correct any nutritional deficiencies / correct anemia / provide nutritional support as appropriate / ambulate when appropriate - PT/OT recs     Post charge discharge plan:  Clinic follow up: General Neurology    Visit Type: in-person only  Timeframe: 4 weeks        Additional Physical Exam, History, & ROS  ROS  Physical Exam    Awake alert following commands   Oriented x 3   No aphasia or cortical signs   No focal motor  or sensory deficits or cranial nerve deficits   No resting tremor / Dyskinesia      Past Medical History:   Diagnosis Date    Hypertension     Seizures      No past surgical history on file.  No family history on file.    Diagnoses  Problem Noted   Dizziness 3/29/2025       Balaji Jones MD    Neurology consultation requested by spoke provider. Audiovisual encounter with the patient performed using a secure connection.  Results and impressions from the visit are documented on this note and were communicated to the consulting provider/team via direct communication. The note has been shared for addition to the patients electronic medical record.

## 2025-03-29 NOTE — PROGRESS NOTES
"ED Observation Unit  Progress Note      HPI   Jose A Sweet is a 49 y.o. male with hx seizure d/o on carbamezapine, zonisamide, htn, hyperlipidemia presenting with episode of severe dizziness marked by lightheadedness and inability to walk independently lasting 1-2 hours four days ago and accompanied by dysarthria in speech for the same period of time. Speech difficulty has resolved. After that he has been able to walk without assistance but feels slight lightheadedness and off-balance. He denies any vertigo. He denies ear issues such as pressure, tinnitus, pain. He denies change in vision, difficulty swallowing. There was no associated focal numbness or weakness. Did have a mild right-sided headache at the same period of time also resolved. He denies head trauma. He denies new medications apart from adjustment of carbamazepine back to standard dose of three capsules twice daily from a reduced dose of one capsule.     Interval History   03/29/25  On review of labs, patient with leukopenia of 3.75.  H&H within normal limits.  Normal platelet count.  BMP is grossly unremarkable.  No acute intracranial process on CT head.  MRI brain was completed this morning which revealed no acute intracranial abnormality.  Bilateral Carotid ultrasound was also completed this morning which was unremarkable. Still awaiting neurology consult.  On my assessment this morning, patient states that he is feeling much better.  States that he only feels "light-headed" when he gets up too quickly. States he feels like "when you just wake up." He denies room spinning sensation, headache, vision changes, unilateral weakness, paresthesias, SOB, chest pain, or palpitations. Denies any pain at this time. He is resting comfortably.     03/28/25  He is outside any window for thrombolytic therapy and endovascular therapy with symptoms markedly improved.   Labs were unremarkable.  I did discuss potential next steps with consideration of CVA versus " TIA inpatient with shared decision-making would wish to come in for further expedited workup due to suspicion for at least TIA. Initial head CT without acute process. Patient to be admitted for further workup and neurology consultation.      I reviewed the ED Provider Note dated 2025  prior to my evaluation of this patient.  I reviewed all labs and imaging performed in the Main ED, prior to patient being placed in Observation. Patient was placed in the ED Observation Unit for dizziness.    PMHx   Past Medical History:   Diagnosis Date    Hypertension     Seizures       No past surgical history on file.     Family Hx   No family history on file.     Social Hx   Social History     Socioeconomic History    Marital status: Single   Tobacco Use    Smoking status: Never    Smokeless tobacco: Never   Substance and Sexual Activity    Alcohol use: No    Drug use: Never    Sexual activity: Yes     Partners: Female     Social Drivers of Health     Financial Resource Strain: At Risk (2024)    Received from [facility]    Financial Resource Strain     Financial Resource Strain: 2   Food Insecurity: At Risk (2024)    Received from [facility]    Food Insecurity     Food: 2   Transportation Needs: Declined (2024)    Received from [facility]    Transportation Needs     Transportation: 99   Physical Activity: Not on File (2024)    Received from [facility]    Physical Activity     Physical Activity: 0   Stress: Not at Risk (2024)    Received from [facility]    Stress     Stress: 1   Housing Stability: Declined (2024)    Received from [facility]    Housing Stability     Housin        Vital Signs   Vitals:    25 2001 25 2344 25 0348 25 0800   BP: 134/80 134/78 139/82 131/77   BP Location: Left arm Left arm Left arm Left arm   Patient Position: Lying Lying Lying Lying   Pulse: 67 61 62 66   Resp: 18 18 18 16   Temp: 98.2 °F (36.8 °C) 98.1 °F (36.7 °C) 97.9 °F (36.6 °C) 97.8 °F (36.6 °C)   TempSrc: Oral Oral  Oral Oral   SpO2: 99% 96% 96% 99%   Weight:       Height:            Review of Systems  ROS  As per HPI.     Brief Physical Exam/Reassessment   Physical Exam  Constitutional:       General: He is not in acute distress.     Appearance: Normal appearance. He is well-developed. He is not ill-appearing, toxic-appearing or diaphoretic.      Comments: Smiling during my exam.   HENT:      Head: Normocephalic and atraumatic.      Right Ear: Tympanic membrane, ear canal and external ear normal.      Left Ear: Tympanic membrane, ear canal and external ear normal.      Nose: Nose normal.      Mouth/Throat:      Mouth: Mucous membranes are moist.      Pharynx: Oropharynx is clear. No oropharyngeal exudate or posterior oropharyngeal erythema.   Eyes:      Extraocular Movements: Extraocular movements intact.      Conjunctiva/sclera: Conjunctivae normal.      Pupils: Pupils are equal, round, and reactive to light.   Cardiovascular:      Rate and Rhythm: Normal rate and regular rhythm.      Pulses: Normal pulses.      Heart sounds: Normal heart sounds.   Pulmonary:      Effort: Pulmonary effort is normal. No respiratory distress.      Breath sounds: Normal breath sounds. No stridor. No wheezing, rhonchi or rales.   Abdominal:      General: Abdomen is flat. Bowel sounds are normal. There is no distension.      Palpations: Abdomen is soft. There is no mass.      Tenderness: There is no abdominal tenderness.      Hernia: No hernia is present.   Musculoskeletal:         General: Normal range of motion.      Cervical back: Normal range of motion and neck supple.   Skin:     General: Skin is warm and dry.   Neurological:      General: No focal deficit present.      Mental Status: He is alert and oriented to person, place, and time.      Comments: No focal neurological deficits.  Answering all questions appropriately.   Psychiatric:         Mood and Affect: Mood normal.         Behavior: Behavior normal.         Thought Content: Thought  content normal.         Judgment: Judgment normal.         Labs/Imaging   Labs Reviewed   CBC WITH DIFFERENTIAL - Abnormal       Result Value    WBC 3.75 (*)     RBC 5.34      HGB 15.9      HCT 46.2      MCV 87      MCH 29.8      MCHC 34.4      RDW 13.1      Platelet Count 227      MPV 10.1      Nucleated RBC 0      Neut % 44.8      Lymph % 37.3      Mono % 16.0 (*)     Eos % 0.5      Basophil % 1.1      Imm Grans % 0.3      Neut # 1.68 (*)     Lymph # 1.40      Mono # 0.60      Eos # 0.02      Baso # 0.04      Imm Grans # 0.01     POCT GLUCOSE - Abnormal    POCT Glucose 153 (*)    BASIC METABOLIC PANEL - Normal    Sodium 137      Potassium 3.7      Chloride 102      CO2 24      Glucose 70      BUN 13      Creatinine 1.1      Calcium 9.8      Anion Gap 11      eGFR >60     CBC W/ AUTO DIFFERENTIAL    Narrative:     The following orders were created for panel order CBC auto differential.  Procedure                               Abnormality         Status                     ---------                               -----------         ------                     CBC with Differential[[id number]]       Abnormal            Final result                 Please view results for these tests on the individual orders.      Imaging Results              US Carotid Bilateral (Final result)  Result time 03/29/25 10:52:28      Final result by Max Crum MD (03/29/25 10:52:28)                   Impression:      No evidence of a hemodynamically significant carotid bifurcation stenosis.      Electronically signed by: Max Crum  Date:    03/29/2025  Time:    10:52               Narrative:    EXAMINATION:  US CAROTID BILATERAL    CLINICAL HISTORY:  Dizziness and giddiness    TECHNIQUE:  Grayscale and color Doppler ultrasound examination of the carotid and vertebral artery systems bilaterally.  Stenosis estimates are per the NASCET measurement criteria.    COMPARISON:  None.    FINDINGS:  Right:    Internal Carotid Artery (ICA)  peak systolic velocity 68 cm/sec    ICA/CCA peak systolic velocity ratio: 0.77    Plaque formation: No significant    Vertebral artery: Antegrade flow and normal waveform.    Left:    Internal Carotid Artery (ICA)  peak systolic velocity 69 cm/sec    ICA/CCA peak systolic velocity ratio: 0.76    Plaque formation: No significant    Vertebral artery: Antegrade flow and normal waveform.                                       MRI Brain Without Contrast (Final result)  Result time 03/28/25 18:53:23      Final result by Nathanael Dupree MD (03/28/25 18:53:23)                   Impression:      No acute intracranial process.  Specifically, no evidence of acute infarction.      Electronically signed by: Nathanael Dupree MD  Date:    03/28/2025  Time:    18:53               Narrative:    EXAMINATION:  MRI BRAIN WITHOUT CONTRAST    CLINICAL HISTORY:  Stroke, follow up;    TECHNIQUE:  Multiplanar multisequence MR imaging of the brain was performed without contrast.    COMPARISON:  CT dated 03/28/2025.    FINDINGS:  There is a mild motion limitations to the exam.    The craniocervical junction is intact.  The sellar and parasellar structures are unremarkable.  The intracranial flow voids are within normal limits.    No acute diffusion-weighted signal abnormality is identified.  The ventricles and sulci are within normal limits.  There is no focal parenchymal signal abnormality.  There are no extra-axial fluid collections.  There is no evidence of intracranial hemorrhage.  There is no evidence of mass effect.    The orbits and intraorbital contents are within normal limits.  There is trace mucosal thickening within the ethmoid sinuses.  The mastoid air cells are clear.                                       CT Head Without Contrast (Final result)  Result time 03/28/25 16:41:24      Final result by Nathanael Dupree MD (03/28/25 16:41:24)                   Impression:      No acute intracranial process.  Consideration for MRI of the brain, as  clinically warranted.      Electronically signed by: Nathanael Dupree MD  Date:    03/28/2025  Time:    16:41               Narrative:    EXAMINATION:  CT HEAD WITHOUT CONTRAST    CLINICAL HISTORY:  Neuro deficit, acute, stroke suspected;Dizziness, persistent/recurrent, cardiac or vascular cause suspected;    TECHNIQUE:  Low dose axial images were obtained through the head.  Coronal and sagittal reformations were also performed. Contrast was not administered.    COMPARISON:  None.    FINDINGS:  The subcutaneous tissues are unremarkable.  The bony calvarium is intact.  The paranasal sinuses are unremarkable.  The mastoid air cells are clear.  There is a chronic fracture of the right medial orbital wall.  The orbits and intraorbital contents are within normal limits.    The craniocervical junction is intact.  The sellar and parasellar structures are unremarkable.  There are no extra-axial fluid collections.  There is no evidence of intracranial hemorrhage.    The ventricles and sulci are within normal limits.  The cisterns are unremarkable.  The gray-white differentiation is maintained.  There is no dense vessel sign.  There is no evidence of mass effect.                                       I reviewed all labs, imaging, EKGs.     Plan   1. Dizziness    2. Episode of change in speech      Awaiting neurology consult. MRI brain and bilateral carotid US unremarkable.  Resolved. Awaiting neurology consult.

## 2025-03-29 NOTE — ED NOTES
03/28/25 2010   Safety Interventions   Quick Updates - Free Text Pt lying in bed awake watching tv. Denies any needs or cncerns at this time. NADN   Updates Patient is resting comfortably;Bed rails up - Call light within reach;Vitals stable;Denies pain   Patient Rounds bed in low position;bed wheels locked;call light in patient/parent reach;clutter free environment maintained;ID band on;placement of personal items at bedside;visualized patient

## 2025-04-01 LAB
OHS QRS DURATION: 92 MS
OHS QTC CALCULATION: 406 MS

## 2025-04-07 ENCOUNTER — OFFICE VISIT (OUTPATIENT)
Dept: PRIMARY CARE CLINIC | Facility: CLINIC | Age: 50
End: 2025-04-07
Payer: MEDICAID

## 2025-04-07 VITALS
RESPIRATION RATE: 18 BRPM | DIASTOLIC BLOOD PRESSURE: 90 MMHG | SYSTOLIC BLOOD PRESSURE: 151 MMHG | OXYGEN SATURATION: 99 % | HEIGHT: 73 IN | WEIGHT: 230.38 LBS | HEART RATE: 72 BPM | BODY MASS INDEX: 30.53 KG/M2

## 2025-04-07 DIAGNOSIS — Z09 HOSPITAL DISCHARGE FOLLOW-UP: Primary | ICD-10-CM

## 2025-04-07 PROCEDURE — 99213 OFFICE O/P EST LOW 20 MIN: CPT | Mod: GE,S$PBB,,

## 2025-04-07 PROCEDURE — 4010F ACE/ARB THERAPY RXD/TAKEN: CPT | Mod: CPTII,,,

## 2025-04-07 PROCEDURE — 3077F SYST BP >= 140 MM HG: CPT | Mod: CPTII,,,

## 2025-04-07 PROCEDURE — 3008F BODY MASS INDEX DOCD: CPT | Mod: CPTII,,,

## 2025-04-07 PROCEDURE — 3080F DIAST BP >= 90 MM HG: CPT | Mod: CPTII,,,

## 2025-04-07 PROCEDURE — 99999 PR PBB SHADOW E&M-EST. PATIENT-LVL II: CPT | Mod: PBBFAC,,,

## 2025-04-07 PROCEDURE — 99212 OFFICE O/P EST SF 10 MIN: CPT | Mod: PBBFAC,PN

## 2025-04-07 NOTE — PROGRESS NOTES
"[facility]    Subjective:     Jose A Sweet is a 49 y.o. year old male with PMHx of seizure disorder, HTN, prediabetes, HLD  who presents to clinic for     Hospital follow up:  - Patient previously in the hospital in March 2025 for CVA rule out\  - Imaging unremarkable during stay, cleared by Neurology with instructions to take aspirin 81mg daily  - States that symptoms would occur only when feeling tired  - Patient endorses that felt "woozy" or akin to "waking up too fast"  - Overall feels better    HTN:  - On olmesartan 40mg  - Was 127/79 earlier at home     Patient Active Problem List    Diagnosis Date Noted    Dizziness 03/29/2025    Abnormal stress ECG with treadmill 04/15/2024    Essential hypertension 04/15/2024    [place] cardiac risk 10-20% in next 10 years 04/15/2024    Hypercholesterolemia 04/15/2024    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   No family history on file.   Social History     Tobacco Use    Smoking status: Never    Smokeless tobacco: Never   Substance Use Topics    Alcohol use: No      Review of Systems   Constitutional:  Negative for chills and fever.   Respiratory:  Negative for shortness of breath.      Objective:     Vitals:    04/07/25 1427   BP: (!) 151/90   BP Location: Left arm   Patient Position: Sitting   Pulse: 72   Resp: 18   SpO2: 99%   Weight: 104.5 kg (230 lb 6.1 oz)   Height: 6' 1" (1.854 m)     Physical Exam  Constitutional:       General: He is not in acute distress.     Appearance: He is not toxic-appearing.   HENT:      Head: Normocephalic and atraumatic.      Right Ear: External ear normal.      Left Ear: External ear normal.      Nose: Nose normal.   Eyes:      Extraocular Movements: Extraocular movements " intact.   Cardiovascular:      Rate and Rhythm: Normal rate.      Pulses: Normal pulses.      Heart sounds: Normal heart sounds. No murmur heard.     No gallop.   Pulmonary:      Effort: Pulmonary effort is normal. No respiratory distress.      Breath sounds: Normal breath sounds. No wheezing.   Musculoskeletal:         General: Normal range of motion.   Skin:     General: Skin is warm and dry.   Neurological:      General: No focal deficit present.      Mental Status: He is alert and oriented to person, place, and time.      Cranial Nerves: No cranial nerve deficit.      Sensory: No sensory deficit.      Motor: No weakness.      Coordination: Coordination normal.      Gait: Gait normal.   Psychiatric:         Mood and Affect: Mood normal.       Assessment/Plan:     Jose A Sweet is a 49 y.o. year old male who presents to clinic for     1. Hospital discharge follow-up (Primary)  - Patient states that his symptoms are improved from previous admission in hospital  - States he is still compliant with aspirin, and reviewed medication list and updated list  - Encourage patient to follow up with PCP for continued workup, may benefit from holter monitor in future    ________________________  Duran Sofia Jr, MD  [facility] PGY-2

## 2025-04-08 NOTE — PROGRESS NOTES
I assume primary medical responsibility for this patient. I have reviewed the history, physical, and assessement & treatment plan with the resident and agree that the care is reasonable and necessary. This service has been performed by a resident without the presence of a teaching physician under the primary care exception. If necessary, an addendum of additional findings or evaluation beyond the resident documentation will be noted below.    Agree with plan however BP noticeably elevated with patient reporting good control at home. Agree with close follow up with PCP for further bp surveillance.

## 2025-08-23 ENCOUNTER — HOSPITAL ENCOUNTER (EMERGENCY)
Facility: OTHER | Age: 50
Discharge: HOME | End: 2025-08-23
Attending: EMERGENCY MEDICINE
Payer: MEDICAID

## 2025-08-23 VITALS
DIASTOLIC BLOOD PRESSURE: 96 MMHG | WEIGHT: 242.94 LBS | BODY MASS INDEX: 32.2 KG/M2 | RESPIRATION RATE: 16 BRPM | SYSTOLIC BLOOD PRESSURE: 189 MMHG | TEMPERATURE: 99 F | OXYGEN SATURATION: 98 % | HEART RATE: 79 BPM | HEIGHT: 73 IN

## 2025-08-23 DIAGNOSIS — R03.0 ELEVATED BLOOD PRESSURE READING: ICD-10-CM

## 2025-08-23 DIAGNOSIS — U07.1 COVID-19: Primary | ICD-10-CM

## 2025-08-23 LAB
CTP QC/QA: YES
SARS-COV-2 RDRP RESP QL NAA+PROBE: POSITIVE

## 2025-08-23 PROCEDURE — 87635 SARS-COV-2 COVID-19 AMP PRB: CPT | Performed by: EMERGENCY MEDICINE

## 2025-08-23 PROCEDURE — 99281 EMR DPT VST MAYX REQ PHY/QHP: CPT

## 2025-08-24 ENCOUNTER — HOSPITAL ENCOUNTER (EMERGENCY)
Facility: OTHER | Age: 50
Discharge: HOME | End: 2025-08-24
Attending: EMERGENCY MEDICINE
Payer: MEDICAID

## 2025-08-24 VITALS
OXYGEN SATURATION: 100 % | RESPIRATION RATE: 17 BRPM | WEIGHT: 234 LBS | BODY MASS INDEX: 34.66 KG/M2 | SYSTOLIC BLOOD PRESSURE: 181 MMHG | DIASTOLIC BLOOD PRESSURE: 98 MMHG | HEIGHT: 69 IN | TEMPERATURE: 98 F | HEART RATE: 56 BPM

## 2025-08-24 DIAGNOSIS — U07.1 COVID-19 VIRUS DETECTED: ICD-10-CM

## 2025-08-24 DIAGNOSIS — R05.9 COUGH: ICD-10-CM

## 2025-08-24 DIAGNOSIS — U07.1 COVID-19: Primary | ICD-10-CM

## 2025-08-24 PROCEDURE — 99283 EMERGENCY DEPT VISIT LOW MDM: CPT | Mod: 25

## 2025-08-24 RX ORDER — BENZONATATE 200 MG/1
200 CAPSULE ORAL 3 TIMES DAILY PRN
Qty: 20 CAPSULE | Refills: 0 | Status: SHIPPED | OUTPATIENT
Start: 2025-08-24 | End: 2025-09-03